# Patient Record
Sex: FEMALE | Race: OTHER
[De-identification: names, ages, dates, MRNs, and addresses within clinical notes are randomized per-mention and may not be internally consistent; named-entity substitution may affect disease eponyms.]

---

## 2018-04-14 ENCOUNTER — HOSPITAL ENCOUNTER (OUTPATIENT)
Dept: HOSPITAL 62 - ER | Age: 23
LOS: 1 days | Discharge: HOME | End: 2018-04-15
Attending: SURGERY
Payer: SELF-PAY

## 2018-04-14 DIAGNOSIS — K80.10: Primary | ICD-10-CM

## 2018-04-14 DIAGNOSIS — F12.10: ICD-10-CM

## 2018-04-14 DIAGNOSIS — F17.200: ICD-10-CM

## 2018-04-14 DIAGNOSIS — K82.1: ICD-10-CM

## 2018-04-14 LAB
ADD MANUAL DIFF: NO
ALBUMIN SERPL-MCNC: 4.3 G/DL (ref 3.5–5)
ALP SERPL-CCNC: 66 U/L (ref 38–126)
ALT SERPL-CCNC: 77 U/L (ref 9–52)
ANION GAP SERPL CALC-SCNC: 12 MMOL/L (ref 5–19)
APPEARANCE UR: (no result)
APTT PPP: YELLOW S
AST SERPL-CCNC: 40 U/L (ref 14–36)
BASOPHILS # BLD AUTO: 0 10^3/UL (ref 0–0.2)
BASOPHILS NFR BLD AUTO: 0.4 % (ref 0–2)
BILIRUB DIRECT SERPL-MCNC: 0.2 MG/DL (ref 0–0.4)
BILIRUB SERPL-MCNC: 0.2 MG/DL (ref 0.2–1.3)
BILIRUB UR QL STRIP: NEGATIVE
BUN SERPL-MCNC: 17 MG/DL (ref 7–20)
CALCIUM: 9.8 MG/DL (ref 8.4–10.2)
CHLORIDE SERPL-SCNC: 106 MMOL/L (ref 98–107)
CO2 SERPL-SCNC: 27 MMOL/L (ref 22–30)
EOSINOPHIL # BLD AUTO: 0.1 10^3/UL (ref 0–0.6)
EOSINOPHIL NFR BLD AUTO: 2.1 % (ref 0–6)
ERYTHROCYTE [DISTWIDTH] IN BLOOD BY AUTOMATED COUNT: 16.5 % (ref 11.5–14)
GLUCOSE SERPL-MCNC: 135 MG/DL (ref 75–110)
GLUCOSE UR STRIP-MCNC: NEGATIVE MG/DL
HCT VFR BLD CALC: 38.7 % (ref 36–47)
HGB BLD-MCNC: 12.3 G/DL (ref 12–15.5)
KETONES UR STRIP-MCNC: NEGATIVE MG/DL
LIPASE SERPL-CCNC: 134.9 U/L (ref 23–300)
LYMPHOCYTES # BLD AUTO: 1.9 10^3/UL (ref 0.5–4.7)
LYMPHOCYTES NFR BLD AUTO: 33.6 % (ref 13–45)
MCH RBC QN AUTO: 23.7 PG (ref 27–33.4)
MCHC RBC AUTO-ENTMCNC: 31.7 G/DL (ref 32–36)
MCV RBC AUTO: 75 FL (ref 80–97)
MONOCYTES # BLD AUTO: 0.5 10^3/UL (ref 0.1–1.4)
MONOCYTES NFR BLD AUTO: 9.1 % (ref 3–13)
NEUTROPHILS # BLD AUTO: 3.1 10^3/UL (ref 1.7–8.2)
NEUTS SEG NFR BLD AUTO: 54.8 % (ref 42–78)
NITRITE UR QL STRIP: NEGATIVE
PH UR STRIP: 5 [PH] (ref 5–9)
PLATELET # BLD: 418 10^3/UL (ref 150–450)
POTASSIUM SERPL-SCNC: 4.3 MMOL/L (ref 3.6–5)
PROT SERPL-MCNC: 7 G/DL (ref 6.3–8.2)
PROT UR STRIP-MCNC: NEGATIVE MG/DL
RBC # BLD AUTO: 5.17 10^6/UL (ref 3.72–5.28)
SODIUM SERPL-SCNC: 144.5 MMOL/L (ref 137–145)
SP GR UR STRIP: 1.03
TOTAL CELLS COUNTED % (AUTO): 100 %
UROBILINOGEN UR-MCNC: 2 MG/DL (ref ?–2)
WBC # BLD AUTO: 5.7 10^3/UL (ref 4–10.5)

## 2018-04-14 PROCEDURE — 96375 TX/PRO/DX INJ NEW DRUG ADDON: CPT

## 2018-04-14 PROCEDURE — 47562 LAPAROSCOPIC CHOLECYSTECTOMY: CPT

## 2018-04-14 PROCEDURE — 88304 TISSUE EXAM BY PATHOLOGIST: CPT

## 2018-04-14 PROCEDURE — 84703 CHORIONIC GONADOTROPIN ASSAY: CPT

## 2018-04-14 PROCEDURE — 96361 HYDRATE IV INFUSION ADD-ON: CPT

## 2018-04-14 PROCEDURE — 85025 COMPLETE CBC W/AUTO DIFF WBC: CPT

## 2018-04-14 PROCEDURE — 81001 URINALYSIS AUTO W/SCOPE: CPT

## 2018-04-14 PROCEDURE — 36415 COLL VENOUS BLD VENIPUNCTURE: CPT

## 2018-04-14 PROCEDURE — 76705 ECHO EXAM OF ABDOMEN: CPT

## 2018-04-14 PROCEDURE — 87040 BLOOD CULTURE FOR BACTERIA: CPT

## 2018-04-14 PROCEDURE — 99285 EMERGENCY DEPT VISIT HI MDM: CPT

## 2018-04-14 PROCEDURE — 96365 THER/PROPH/DIAG IV INF INIT: CPT

## 2018-04-14 PROCEDURE — 83690 ASSAY OF LIPASE: CPT

## 2018-04-14 PROCEDURE — 0FT44ZZ RESECTION OF GALLBLADDER, PERCUTANEOUS ENDOSCOPIC APPROACH: ICD-10-PCS | Performed by: SURGERY

## 2018-04-14 PROCEDURE — S0028 INJECTION, FAMOTIDINE, 20 MG: HCPCS

## 2018-04-14 PROCEDURE — 80053 COMPREHEN METABOLIC PANEL: CPT

## 2018-04-14 PROCEDURE — 96372 THER/PROPH/DIAG INJ SC/IM: CPT

## 2018-04-14 RX ADMIN — FAMOTIDINE SCH MG: 10 INJECTION INTRAVENOUS at 21:24

## 2018-04-14 RX ADMIN — DEXTROSE SCH GM: 50 INJECTION, SOLUTION INTRAVENOUS at 21:24

## 2018-04-14 RX ADMIN — MORPHINE SULFATE PRN MG: 10 INJECTION INTRAMUSCULAR; INTRAVENOUS; SUBCUTANEOUS at 17:10

## 2018-04-14 NOTE — ER DOCUMENT REPORT
ED General





- General


Chief Complaint: Abdominal Pain


Stated Complaint: STOMACH PAIN


Time Seen by Provider: 04/14/18 09:07


TRAVEL OUTSIDE OF THE [place] IN LAST 30 DAYS: No





- HPI


Patient complains to provider of: Abdominal pain


Notes: 





Patient coming in for evaluation of right upper quadrant abdominal pain sharp 

radiating to her back.  Patient also states nausea no vomiting no diarrhea no 

fevers.  Patient states started around 2:00 worse earlier this morning.  

Patient has a known gallstones.  Patient when asked what she had a ER last meal 

was very unclear.  Patient denies any other past medical history does not take 

any other medications.





- Related Data


Allergies/Adverse Reactions: 


 





No Known Allergies Allergy (Unverified 04/14/18 08:46)


 











Past Medical History





- Social History


Smoking Status: Current Some Day Smoker


Frequency of alcohol use: Occasional


Drug Abuse: Marijuana


Family History: Reviewed & Not Pertinent


Patient has suicidal ideation: No


Patient has homicidal ideation: No


Renal/ Medical History: Denies: Hx Peritoneal Dialysis





Review of Systems





- Review of Systems


Constitutional: No symptoms reported


EENT: No symptoms reported


Cardiovascular: No symptoms reported


Respiratory: No symptoms reported


Gastrointestinal: Abdominal pain


Genitourinary: No symptoms reported


Female Genitourinary: No symptoms reported


Musculoskeletal: No symptoms reported


Skin: No symptoms reported


Hematologic/Lymphatic: No symptoms reported


Neurological/Psychological: No symptoms reported


-: Yes All other systems reviewed and negative





Physical Exam





- Vital signs


Vitals: 


 











Temp Pulse Resp BP Pulse Ox


 


 98.2 F   84   19   148/83 H  99 


 


 04/14/18 08:49  04/14/18 08:49  04/14/18 08:49  04/14/18 08:49  04/14/18 08:49











Interpretation: Normal





- General


General appearance: Appears well, Alert





- HEENT


Head: Normocephalic, Atraumatic


Eyes: Normal


Pupils: PERRL





- Respiratory


Respiratory status: No respiratory distress


Chest status: Nontender


Breath sounds: Normal


Chest palpation: Normal





- Cardiovascular


Rhythm: Regular


Heart sounds: Normal auscultation


Murmur: No





- Abdominal


Inspection: Normal


Distension: No distension


Bowel sounds: Normal


Tenderness: Tender - Right upper quadrant tenderness moderate, Rubio's sign


Organomegaly: No organomegaly





- Back


Back: Normal, Nontender





- Extremities


General upper extremity: Normal inspection, Nontender, Normal color, Normal ROM

, Normal temperature


General lower extremity: Normal inspection, Nontender, Normal color, Normal ROM

, Normal temperature, Normal weight bearing.  No: Kamran's sign





- Neurological


Neuro grossly intact: Yes


Cognition: Normal


Orientation: AAOx4


[place] Coma Scale Eye Opening: Spontaneous


[place] Coma Scale Verbal: Oriented


Yohan Coma Scale Motor: Obeys Commands


Yohan Coma Scale Total: 15


Speech: Normal


Motor strength normal: LUE, RUE, LLE, RLE


Sensory: Normal





- Psychological


Associated symptoms: Normal affect, Normal mood





- Skin


Skin Temperature: Warm


Skin Moisture: Dry


Skin Color: Normal





Course





- Re-evaluation


Re-evalutation: 





04/14/18 14:38


Patient was to have symptomatic cholelithiasis.  Did discuss results with 

surgeon on-call Dr. Ott who agrees to take the patient to the OR.  Patient 

agrees with operative management.  Patient was given cefoxitin 2 g per surgeon 

request





- Vital Signs


Vital signs: 


 











Temp Pulse Resp BP Pulse Ox


 


 98.2 F   84   19   148/83 H  99 


 


 04/14/18 08:49  04/14/18 08:49  04/14/18 08:49  04/14/18 08:49  04/14/18 08:49














- Laboratory


Result Diagrams: 


 04/14/18 09:25





 04/14/18 09:25


Laboratory results interpreted by me: 


 











  04/14/18 04/14/18 04/14/18





  09:25 09:25 10:52


 


MCV  75 L  


 


MCH  23.7 L  


 


MCHC  31.7 L  


 


RDW  16.5 H  


 


Glucose   135 H 


 


AST   40 H 


 


ALT   77 H 


 


Urine Urobilinogen    2.0 H


 


Ur Leukocyte Esterase    MODERATE H














Discharge





- Discharge


Clinical Impression: 


 Symptomatic cholelithiasis





Condition: Good


Disposition: ADMITTED AS OBSERVATION


Admitting Provider: Surgicalist Julius Ott


Unit Admitted: OR

## 2018-04-14 NOTE — OPERATIVE REPORT E
Operative Report



NAME: CHIKA VENTURA

MRN:  [id number]          : 1995 AGE:  22Y

DATE OF SURGERY: 2018               ROOM: 228



PREOPERATIVE DIAGNOSES:

1.   Symptomatic cholelithiasis.

2.   Hydrops of the gallbladder.



POSTOPERATIVE DIAGNOSES:

1.   Symptomatic cholelithiasis.

2.   Hydrops of the gallbladder.



OPERATION:

Laparoscopic cholecystectomy.



SURGEON:

MARAL AARON M.D.



ASSISTANT:

None.



ESTIMATED BLOOD LOSS:

Less than 5 mL.



COMPLICATIONS:

None.



ANESTHESIA:

General plus 20 mL of 1% lidocaine with epinephrine.



FLUIDS:

700 mL.



URINE OUTPUT:  Moderate.



DRAINS:

None.



INDICATIONS AND FINDINGS:

This is a young, 22-year-old, -American female who presented to the

hospital with a 12 hour history of right upper quadrant pain, epigastric

pain, intense nausea, and found to have cholelithiasis on ultrasound with

normal liver profile.  Decision was made to take the patient to surgery to

undergo a laparoscopic cholecystectomy, possible open cholangiogram.  Procedure

risks and benefits and complications were explained to the patient

including a possibility of bleeding or injury of the common bile duct 

which might require a repair in a tertiary center.

All her questions were answered.  She decided to proceed.



DESCRIPTION OF PROCEDURE:

Procedure was done in the operating room.  The patient was placed in the

supine position.  General anesthesia was induced by endotracheal

intubation.  Her abdomen was prepped and draped in the usual fashion.  An

incision was made just below the umbilicus.  The skin was tethered with

towel clips, and a 5 mm port with Optiview adaptor and the scope was

inserted through the abdominal wall and a CO2 pneumoperitoneum  was 
established.  Under

direct visualization, three additional ports one 12 mm and two 5 mm ports

were placed in the epigastrium and right lower quadrant of the abdomen, 

respectively. The gallbladder was grasped at the level of the fundus and 
elevated.  The

neck of the gallbladder was then grasped and pulled anteriorly to the

patient's left.  The critical view of safety was obtained by dividing the 

gallbladder neck peritoneal attachements medially and laterally. 

The dissection was then continued toward the cystic duct until a critical view

of safety was obtained.  Visualization of the cystic duct was accomplished

with a Hook cautery at low settings and a 10 mm clip applier  was fired

proximally and distally to the cystic duct division point, and this was then 
divided with

scissors.  The cystic duct was then divided posteriorly after it was

singly clipped proximally and distally.  The gallbladder was dissected off the 
liver bed

using hook cautery at higher settings and extracted from the peritoneal cavity 
using an

Endobag.  The CO2 pneumoperitoneum was then re-established.  The liver bed

was inspected.  No active bleeding was noted.  The liver bed was irrigated

with normal saline until clear, and this was slowly aspirated.  Under

direct visualization, using a fascial closure device, a figure-of-eight O

Vicryl suture was placed to close the epigastric fascial defect.  The

suture was left untied.  All instruments were removed.  The CO2

pneumoperitoneum was released.  The ports were removed.  The fascial

defect of the epigastrium was closed with the previously placed

figure-of-eight 0 Vicryl suture.  All incisions were closed with 4-O Vicryl

running subcuticular suture with Dermabond applied.  The patient tolerated

the procedure well, was extubated, and transferred to the recovery room in

satisfactory condition.





DICTATING PHYSICIAN:  MARAL AARON M.D.





[id number]                  DT: 2018    1702

PHY#: [id number]            DD: 2018    1559

ID:   [id number]           JOB#: [id number]       ACCT: [id number]



cc:MARAL AARON M.D.

>







MTDD

## 2018-04-14 NOTE — PDOC H&P
History of Present Illness


Admission Date/PCP: 





4/14/18


Patient complains of: RUQ pain


History of Present Illness: 


CHIKA VENTURA is a 22 year old female with a 12 hr hx of RUQ pain, intense 

nausea; an US of the GB was done revealing hydrops of the GB with stones.








Social History


Smoking Status: Current Some Day Smoker


Frequency of Alcohol Use: Occasional


Hx Recreational Drug Use: Yes


Drugs: Marijuana





Family History


Family History: Reviewed & Not Pertinent


Parental Family History Reviewed: No


Children Family History Reviewed: No


Sibling(s) Family History Reviewed.: No





Medication/Allergy


Allergies/Adverse Reactions: 


 





No Known Allergies Allergy (Unverified 04/14/18 08:46)


 











Physical Exam


Vital Signs: 


 











Temp Pulse Resp BP Pulse Ox


 


 98.2 F   84   19   148/83 H  99 


 


 04/14/18 08:49  04/14/18 08:49  04/14/18 08:49  04/14/18 08:49  04/14/18 08:49








 Intake & Output











 04/13/18 04/14/18 04/15/18





 06:59 06:59 06:59


 


Weight   117.5 kg











General appearance: PRESENT: no acute distress


Eye exam: PRESENT: EOMI


Mouth exam: PRESENT: moist


Neck exam: PRESENT: full ROM


Cardiovascular exam: PRESENT: RRR


GI/Abdominal exam: PRESENT: soft, tenderness - RUQ





Results


Laboratory Results: 


 





 04/14/18 09:25 





 04/14/18 09:25 





 











  04/14/18 04/14/18 04/14/18





  09:25 09:25 09:25


 


WBC  5.7  


 


RBC  5.17  


 


Hgb  12.3  


 


Hct  38.7  


 


MCV  75 L  


 


MCH  23.7 L  


 


MCHC  31.7 L  


 


RDW  16.5 H  


 


Plt Count  418  


 


Seg Neutrophils %  54.8  


 


Lymphocytes %  33.6  


 


Monocytes %  9.1  


 


Eosinophils %  2.1  


 


Basophils %  0.4  


 


Absolute Neutrophils  3.1  


 


Absolute Lymphocytes  1.9  


 


Absolute Monocytes  0.5  


 


Absolute Eosinophils  0.1  


 


Absolute Basophils  0.0  


 


Sodium   144.5 


 


Potassium   4.3 


 


Chloride   106 


 


Carbon Dioxide   27 


 


Anion Gap   12 


 


BUN   17 


 


Creatinine   0.60 


 


Est GFR ( Amer)   > 60 


 


Est GFR (Non-Af Amer)   > 60 


 


Glucose   135 H 


 


Calcium   9.8 


 


Total Bilirubin   0.2 


 


AST   40 H 


 


ALT   77 H 


 


Alkaline Phosphatase   66 


 


Total Protein   7.0 


 


Albumin   4.3 


 


Lipase   134.9 


 


Serum HCG, Qual    NEGATIVE


 


Urine Color   


 


Urine Appearance   


 


Urine pH   


 


Ur Specific Gravity   


 


Urine Protein   


 


Urine Glucose (UA)   


 


Urine Ketones   


 


Urine Blood   


 


Urine Nitrite   


 


Ur Leukocyte Esterase   


 


Urine WBC (Auto)   


 


Urine RBC (Auto)   














  04/14/18





  10:52


 


WBC 


 


RBC 


 


Hgb 


 


Hct 


 


MCV 


 


MCH 


 


MCHC 


 


RDW 


 


Plt Count 


 


Seg Neutrophils % 


 


Lymphocytes % 


 


Monocytes % 


 


Eosinophils % 


 


Basophils % 


 


Absolute Neutrophils 


 


Absolute Lymphocytes 


 


Absolute Monocytes 


 


Absolute Eosinophils 


 


Absolute Basophils 


 


Sodium 


 


Potassium 


 


Chloride 


 


Carbon Dioxide 


 


Anion Gap 


 


BUN 


 


Creatinine 


 


Est GFR ( Amer) 


 


Est GFR (Non-Af Amer) 


 


Glucose 


 


Calcium 


 


Total Bilirubin 


 


AST 


 


ALT 


 


Alkaline Phosphatase 


 


Total Protein 


 


Albumin 


 


Lipase 


 


Serum HCG, Qual 


 


Urine Color  YELLOW


 


Urine Appearance  SLIGHTLY-CLOUDY


 


Urine pH  5.0


 


Ur Specific Gravity  1.029


 


Urine Protein  NEGATIVE


 


Urine Glucose (UA)  NEGATIVE


 


Urine Ketones  NEGATIVE


 


Urine Blood  NEGATIVE


 


Urine Nitrite  NEGATIVE


 


Ur Leukocyte Esterase  MODERATE H


 


Urine WBC (Auto)  12


 


Urine RBC (Auto)  1











Impressions: 


 





Abdomen Ultrasound  04/14/18 09:12


IMPRESSION:  Cholelithiasis with a hydropic gallbladder and no gallbladder wall 

thickening or pericholecystic fluid.  These findings are equivocal for acute 

cholecystitis.


 














Assessment & Plan





- Plan Summary


Plan Summary: 





A/





Symptomatic cholelithiasis with hydrops of the GB


Slight elevation of LFT


Remaining blood work WNL





P/





NPO


IV Hydration


Mefoxin 2 gr IVPB preop


Laparoscopci cholecystectomy, possible open, possible cholangiogram


Procedure, risks, benefits, complications, including bleeding from the liver 

and or injury of the bile ducts requiring transfer to a tertiary center have 

bee d/w patient, her questions were answered, and she decided to proceed

## 2018-04-14 NOTE — RADIOLOGY REPORT (SQ)
EXAM DESCRIPTION:  U/S ABDOMEN LIMITED W/O DOP



COMPLETED DATE/TIME:  4/14/2018 9:55 am



REASON FOR STUDY:  RUQ PAIN



COMPARISON:  None.



TECHNIQUE:  Dynamic and static grayscale images acquired of the abdomen and recorded on PACS. Additio
nal selected color Doppler and spectral images recorded.



LIMITATIONS:  None.



FINDINGS:  PANCREAS: The tail is not visualized.  Visualized portions of the head and body are grossl
y unremarkable.

LIVER: Increased echogenicity with coarsened echotexture.  Limited evaluation of the parenchyma secon
cee to these findings.

LIVER VASCULATURE: Normal directional flow of the main portal vein.

GALLBLADDER: There is a 2.1 cm stone that is non mobile and situated within the neck of the gallbladd
er.  The gallbladder is hydropic at 13 cm in size.  No wall thickening or pericholecystic fluid.

ULTRASOUND-DETECTED AVALOS'S SIGN: Negative.

INTRAHEPATIC DUCTS AND COMMON DUCT: CBD and intrahepatic ducts normal caliber. No filling defects.

AORTA: No aneurysm.

RIGHT KIDNEY:  Normal size. Normal echogenicity. No solid or suspicious masses. No hydronephrosis. No
 calcifications.

PERITONEAL AND RIGHT PLEURAL SPACE: No ascites or effusions.

OTHER: No other significant findings.



IMPRESSION:  Cholelithiasis with a hydropic gallbladder and no gallbladder wall thickening or pericho
lecystic fluid.  These findings are equivocal for acute cholecystitis.



TECHNICAL DOCUMENTATION:  JOB ID:  [id number]

 2011 [employer]- All Rights Reserved



Reading location - IP/workstation name: [email or web address]

## 2018-04-15 VITALS — DIASTOLIC BLOOD PRESSURE: 72 MMHG | SYSTOLIC BLOOD PRESSURE: 128 MMHG

## 2018-04-15 LAB
ADD MANUAL DIFF: NO
ALBUMIN SERPL-MCNC: 3.6 G/DL (ref 3.5–5)
ALP SERPL-CCNC: 61 U/L (ref 38–126)
ALT SERPL-CCNC: 97 U/L (ref 9–52)
ANION GAP SERPL CALC-SCNC: 11 MMOL/L (ref 5–19)
AST SERPL-CCNC: 69 U/L (ref 14–36)
BASOPHILS # BLD AUTO: 0.1 10^3/UL (ref 0–0.2)
BASOPHILS NFR BLD AUTO: 0.7 % (ref 0–2)
BILIRUB DIRECT SERPL-MCNC: 0.2 MG/DL (ref 0–0.4)
BILIRUB SERPL-MCNC: 0.2 MG/DL (ref 0.2–1.3)
BUN SERPL-MCNC: 11 MG/DL (ref 7–20)
CALCIUM: 9.5 MG/DL (ref 8.4–10.2)
CHLORIDE SERPL-SCNC: 109 MMOL/L (ref 98–107)
CO2 SERPL-SCNC: 23 MMOL/L (ref 22–30)
EOSINOPHIL # BLD AUTO: 0 10^3/UL (ref 0–0.6)
EOSINOPHIL NFR BLD AUTO: 0.3 % (ref 0–6)
ERYTHROCYTE [DISTWIDTH] IN BLOOD BY AUTOMATED COUNT: 16.6 % (ref 11.5–14)
GLUCOSE SERPL-MCNC: 124 MG/DL (ref 75–110)
HCT VFR BLD CALC: 35.1 % (ref 36–47)
HGB BLD-MCNC: 11 G/DL (ref 12–15.5)
LYMPHOCYTES # BLD AUTO: 1.2 10^3/UL (ref 0.5–4.7)
LYMPHOCYTES NFR BLD AUTO: 10.4 % (ref 13–45)
MCH RBC QN AUTO: 23.3 PG (ref 27–33.4)
MCHC RBC AUTO-ENTMCNC: 31.3 G/DL (ref 32–36)
MCV RBC AUTO: 75 FL (ref 80–97)
MONOCYTES # BLD AUTO: 0.5 10^3/UL (ref 0.1–1.4)
MONOCYTES NFR BLD AUTO: 4.7 % (ref 3–13)
NEUTROPHILS # BLD AUTO: 9.4 10^3/UL (ref 1.7–8.2)
NEUTS SEG NFR BLD AUTO: 83.9 % (ref 42–78)
PLATELET # BLD: 411 10^3/UL (ref 150–450)
POTASSIUM SERPL-SCNC: 4.3 MMOL/L (ref 3.6–5)
PROT SERPL-MCNC: 6.5 G/DL (ref 6.3–8.2)
RBC # BLD AUTO: 4.7 10^6/UL (ref 3.72–5.28)
SODIUM SERPL-SCNC: 142.9 MMOL/L (ref 137–145)
TOTAL CELLS COUNTED % (AUTO): 100 %
WBC # BLD AUTO: 11.2 10^3/UL (ref 4–10.5)

## 2018-04-15 RX ADMIN — DEXTROSE SCH GM: 50 INJECTION, SOLUTION INTRAVENOUS at 05:32

## 2018-04-15 RX ADMIN — FAMOTIDINE SCH MG: 10 INJECTION INTRAVENOUS at 10:59

## 2018-04-15 RX ADMIN — SODIUM CHLORIDE PRN ML: 9 INJECTION, SOLUTION INTRAVENOUS at 10:59

## 2018-04-15 RX ADMIN — MORPHINE SULFATE PRN MG: 10 INJECTION INTRAMUSCULAR; INTRAVENOUS; SUBCUTANEOUS at 03:38

## 2018-04-15 RX ADMIN — MORPHINE SULFATE PRN MG: 10 INJECTION INTRAMUSCULAR; INTRAVENOUS; SUBCUTANEOUS at 00:05

## 2018-04-15 RX ADMIN — MORPHINE SULFATE PRN MG: 10 INJECTION INTRAMUSCULAR; INTRAVENOUS; SUBCUTANEOUS at 08:49

## 2018-04-15 RX ADMIN — MORPHINE SULFATE PRN MG: 10 INJECTION INTRAMUSCULAR; INTRAVENOUS; SUBCUTANEOUS at 06:47

## 2018-04-15 RX ADMIN — SODIUM CHLORIDE PRN ML: 9 INJECTION, SOLUTION INTRAVENOUS at 01:06

## 2018-04-15 NOTE — PDOC PROGRESS REPORT
Subjective


Progress Note for:: 04/15/18


Subjective:: 





no c/o


Reason For Visit: 


SYMPTOMATIC CHOLELITHIASIS








Physical Exam


Vital Signs: 


 











Temp Pulse Resp BP Pulse Ox


 


 97.8 F   78   16   119/61   99 


 


 04/15/18 11:05  04/15/18 11:05  04/15/18 11:05  04/15/18 11:05  04/15/18 11:05








 Intake & Output











 04/14/18 04/15/18 04/16/18





 06:59 06:59 06:59


 


Intake Total  1200 


 


Output Total  500 


 


Balance  700 











General appearance: PRESENT: no acute distress


Respiratory exam: PRESENT: clear to auscultation [place]


Cardiovascular exam: PRESENT: RRR


GI/Abdominal exam: PRESENT: soft, other - incisions c/d/i





Results


Laboratory Results: 


 





 04/15/18 07:16 





 04/15/18 07:16 





 











  04/15/18 04/15/18





  07:16 07:16


 


WBC  11.2 H 


 


RBC  4.70 


 


Hgb  11.0 L 


 


Hct  35.1 L 


 


MCV  75 L 


 


MCH  23.3 L 


 


MCHC  31.3 L 


 


RDW  16.6 H 


 


Plt Count  411 


 


Seg Neutrophils %  83.9 H 


 


Lymphocytes %  10.4 L 


 


Monocytes %  4.7 


 


Eosinophils %  0.3 


 


Basophils %  0.7 


 


Absolute Neutrophils  9.4 H 


 


Absolute Lymphocytes  1.2 


 


Absolute Monocytes  0.5 


 


Absolute Eosinophils  0.0 


 


Absolute Basophils  0.1 


 


Sodium   142.9


 


Potassium   4.3


 


Chloride   109 H


 


Carbon Dioxide   23


 


Anion Gap   11


 


BUN   11


 


Creatinine   0.58


 


Est GFR ( Amer)   > 60


 


Est GFR (Non-Af Amer)   > 60


 


Glucose   124 H


 


Calcium   9.5


 


Total Bilirubin   0.2


 


AST   69 H


 


ALT   97 H


 


Alkaline Phosphatase   61


 


Total Protein   6.5


 


Albumin   3.6











Impressions: 


 





Abdomen Ultrasound  04/14/18 09:12


IMPRESSION:  Cholelithiasis with a hydropic gallbladder and no gallbladder wall 

thickening or pericholecystic fluid.  These findings are equivocal for acute 

cholecystitis.


 














Assessment & Plan





- Plan Summary


Plan Summary: 





A/





POD#1 after lap tano for symptomatic cholelithiasis


VSS, AF


Tolerating po well


WBC slight elevated, mnost likely reactive


Slight elevateion ALT/ASt with normal total bilirubin


PE unremarkable








P/





Home today


F/u in the office with CAROLYNN Engel in 1 week


Tylenol only for pain


Return to work on 4/27/18


No wound care needed


shower only, bathe in 2 weeks


Return to all activities without limitations

## 2018-04-15 NOTE — DISCHARGE SUMMARY E
Discharge Summary



NAME: CHIKA VENTURA

MRN:  [id number]        : 1995     AGE: 22Y

ADMITTED: 2018                  DISCHARGED: 04/15/2018



FINAL DIAGNOSIS:

Symptomatic cholelithiasis.



PROCEDURE PERFORMED:

Laparoscopic cholecystectomy performed on 2018.



COMPLICATIONS:

None.



HOSPITAL COURSE:

This is a 22-year-old female, healthy, who came to the hospital with a

12-hour history of right upper quadrant pain and intense nausea.  She was

found to have symptomatic cholelithiasis.  The patient underwent an

uneventful laparoscopic cholecystectomy on 2018.  She was

hospitalized overnight.  Her fluids were then hep-locked.  Her oral intake

was well tolerated.  She complained of minimal abdominal pain and blood

work was within normal limits.  Her vital signs remained stable.  Physical

examination was unremarkable.  The patient was discharged home on April

15, 2018.



DISCHARGE ORDERS:

The patient was discharged on April 15, 2018.  Follow-up appointment in

the surgery clinic with the physician assistant, Layla Vásquez, in one

week.  Tylenol only for pain.  Shower only, bathe in two weeks.  Return to

work next week on 2018, without limitations.  Regular

diet.



DICTATING PHYSICIAN:  MARAL AARON M.D.





[id number]                  DT: 04/15/2018    1622

PHY#: [id number]            DD: 04/15/2018    1243

ID:   [id number]           JOB#: [id number]       ACCT: [id number]



cc:INGRID CREWS MD, M.D.

   [facility],

>

## 2020-05-27 ENCOUNTER — HOSPITAL ENCOUNTER (EMERGENCY)
Dept: HOSPITAL 62 - ER | Age: 25
Discharge: HOME | End: 2020-05-27
Payer: SELF-PAY

## 2020-05-27 VITALS — SYSTOLIC BLOOD PRESSURE: 115 MMHG | DIASTOLIC BLOOD PRESSURE: 67 MMHG

## 2020-05-27 DIAGNOSIS — R82.4: ICD-10-CM

## 2020-05-27 DIAGNOSIS — O26.891: ICD-10-CM

## 2020-05-27 DIAGNOSIS — O20.9: Primary | ICD-10-CM

## 2020-05-27 DIAGNOSIS — O36.0910: ICD-10-CM

## 2020-05-27 DIAGNOSIS — Z3A.01: ICD-10-CM

## 2020-05-27 LAB
ADD MANUAL DIFF: NO
ALBUMIN SERPL-MCNC: 4.4 G/DL (ref 3.5–5)
ALP SERPL-CCNC: 70 U/L (ref 38–126)
ANION GAP SERPL CALC-SCNC: 10 MMOL/L (ref 5–19)
APPEARANCE UR: (no result)
APTT PPP: YELLOW S
AST SERPL-CCNC: 22 U/L (ref 14–36)
BASOPHILS # BLD AUTO: 0 10^3/UL (ref 0–0.2)
BASOPHILS NFR BLD AUTO: 0.9 % (ref 0–2)
BILIRUB DIRECT SERPL-MCNC: 0 MG/DL (ref 0–0.4)
BILIRUB SERPL-MCNC: 0.5 MG/DL (ref 0.2–1.3)
BILIRUB UR QL STRIP: NEGATIVE
BUN SERPL-MCNC: 11 MG/DL (ref 7–20)
CALCIUM: 9.7 MG/DL (ref 8.4–10.2)
CHLORIDE SERPL-SCNC: 105 MMOL/L (ref 98–107)
CO2 SERPL-SCNC: 24 MMOL/L (ref 22–30)
EOSINOPHIL # BLD AUTO: 0 10^3/UL (ref 0–0.6)
EOSINOPHIL NFR BLD AUTO: 0.4 % (ref 0–6)
ERYTHROCYTE [DISTWIDTH] IN BLOOD BY AUTOMATED COUNT: 16 % (ref 11.5–14)
GLUCOSE SERPL-MCNC: 97 MG/DL (ref 75–110)
GLUCOSE UR STRIP-MCNC: NEGATIVE MG/DL
HCT VFR BLD CALC: 35.6 % (ref 36–47)
HGB BLD-MCNC: 11.4 G/DL (ref 12–15.5)
KETONES UR STRIP-MCNC: 80 MG/DL
LYMPHOCYTES # BLD AUTO: 1.3 10^3/UL (ref 0.5–4.7)
LYMPHOCYTES NFR BLD AUTO: 34.6 % (ref 13–45)
MCH RBC QN AUTO: 24.1 PG (ref 27–33.4)
MCHC RBC AUTO-ENTMCNC: 32.1 G/DL (ref 32–36)
MCV RBC AUTO: 75 FL (ref 80–97)
MONOCYTES # BLD AUTO: 0.3 10^3/UL (ref 0.1–1.4)
MONOCYTES NFR BLD AUTO: 9.5 % (ref 3–13)
NEUTROPHILS # BLD AUTO: 2 10^3/UL (ref 1.7–8.2)
NEUTS SEG NFR BLD AUTO: 54.6 % (ref 42–78)
NITRITE UR QL STRIP: NEGATIVE
PH UR STRIP: 5 [PH] (ref 5–9)
PLATELET # BLD: 419 10^3/UL (ref 150–450)
POTASSIUM SERPL-SCNC: 4.5 MMOL/L (ref 3.6–5)
PROT SERPL-MCNC: 7.7 G/DL (ref 6.3–8.2)
PROT UR STRIP-MCNC: 30 MG/DL
RBC # BLD AUTO: 4.74 10^6/UL (ref 3.72–5.28)
SP GR UR STRIP: 1.03
TOTAL CELLS COUNTED % (AUTO): 100 %
UROBILINOGEN UR-MCNC: NEGATIVE MG/DL (ref ?–2)
WBC # BLD AUTO: 3.7 10^3/UL (ref 4–10.5)

## 2020-05-27 PROCEDURE — 99284 EMERGENCY DEPT VISIT MOD MDM: CPT

## 2020-05-27 PROCEDURE — 83690 ASSAY OF LIPASE: CPT

## 2020-05-27 PROCEDURE — 86901 BLOOD TYPING SEROLOGIC RH(D): CPT

## 2020-05-27 PROCEDURE — 81001 URINALYSIS AUTO W/SCOPE: CPT

## 2020-05-27 PROCEDURE — 84702 CHORIONIC GONADOTROPIN TEST: CPT

## 2020-05-27 PROCEDURE — 76817 TRANSVAGINAL US OBSTETRIC: CPT

## 2020-05-27 PROCEDURE — 93976 VASCULAR STUDY: CPT

## 2020-05-27 PROCEDURE — 36415 COLL VENOUS BLD VENIPUNCTURE: CPT

## 2020-05-27 PROCEDURE — 86900 BLOOD TYPING SEROLOGIC ABO: CPT

## 2020-05-27 PROCEDURE — 80053 COMPREHEN METABOLIC PANEL: CPT

## 2020-05-27 PROCEDURE — 85025 COMPLETE CBC W/AUTO DIFF WBC: CPT

## 2020-05-27 PROCEDURE — 86850 RBC ANTIBODY SCREEN: CPT

## 2020-05-27 NOTE — ER DOCUMENT REPORT
ED General





- General


Chief Complaint: Vag Bleeding, +preg <12wks


Stated Complaint: VAGINAL BLEEDING


Time Seen by Provider: 05/27/20 09:16


Primary Care Provider: 


[facility] [Provider Group] - Follow up in 3-5 days


Notes: 





Patient is a G2, P1 24-year-old female who presents emergency department who 

presents the emergency department with vaginal bleeding.  Patient states that 

she is about 4 weeks pregnant.  Last menstrual cycle was May 24.  She states 

that this morning when she woke up she felt a little bit of cramping and when 

she went to go wipe, saw a little bit of blood on her toilet paper.  Patient 

denies any clots.  Denies any past medical history.  She does not take any 

medications.  Started prenatal vitamins the other day.  Has not seen OB for care

yet.


TRAVEL OUTSIDE OF THE [place] IN LAST 30 DAYS: No





- Related Data


Allergies/Adverse Reactions: 


                                        





No Known Allergies Allergy (Unverified 04/14/18 08:46)


   











Past Medical History





- Social History


Smoking Status: Never Smoker


Family History: Reviewed & Not Pertinent


Patient has homicidal ideation: No


Renal/ Medical History: Denies: Hx Peritoneal Dialysis





Review of Systems





- Review of Systems


Notes: 





REVIEW OF SYSTEMS:





CONSTITUTIONAL :    Denies recent illness.  Denies recent unintentional weight 

loss.  Denies fever,  chills, or sweats. 


EENT: Denies eye, ear, throat, or mouth pain, discharge, or symptoms.  Denies 

nasal or sinus congestion.


CARDIOVASCULAR:  Denies chest pain.


RESPIRATORY: Denies shortness of breath, cough, congestion, difficulty 

breathing, or wheezing. 


GASTROINTESTINAL: Denies nausea, vomiting, and diarrhea.  Denies abdominal pain.

 Denies constipation.   


GENITOURINARY:  Denies difficulty urinating, burning, blood in urine, urgency or

frequency.


FEMALE  GENITOURINARY: See HPI.


MUSCULOSKELETAL:  Denies neck and back pain.  Denies joint pain or swelling.


SKIN:   Denies rash, itchiness, or lesions


HEMATOLOGIC :   Denies easy bruising or bleeding.


LYMPHATIC:  Denies swollen, painful, enlarged glands.


NEUROLOGICAL: Denies no numbness or tingling denies weakness.  Denies headache. 

Denies altered mental status.  Denies alteration in speech.


PSYCHIATRIC:  Denies stress, anxiety, alteration in sleep patterns, or 

depression.





All other systems reviewed and negative.





Physical Exam





- Vital signs


Vitals: 


                                        











Temp Pulse Resp BP Pulse Ox


 


 98.6 F   78   18   134/68 H  100 


 


 05/27/20 08:44  05/27/20 08:44  05/27/20 08:44  05/27/20 08:44  05/27/20 08:44














- Notes


Notes: 





PHYSICAL EXAMINATION:





GENERAL: Appears well, healthy, well-nourished, no acute distress. 





HEAD:  Normocephalic, atraumatic.





EYES:  PERRL, conjunctiva normal, all extraocular movements intact, sclera 

nonicteric





ENT:  Moist mucous membranes. 





NECK: Supple, no noticeable swelling, redness, rash.  Normal range of motion.





LUNGS: Equal breath sounds bilaterally and clear to auscultation.  No wheezes 

rales or rhonchi.





CARDIOVASCULAR: S1-S2, regular rate, regular rhythm.  Radial pulses 2+, normal.





ABDOMEN: Normoactive bowel sounds.  Soft, nontender,  no guarding, no rebound 

tenderness, and no masses palpated.





EXTREMITIES: Normal strength and range of motion, no pitting or edema.  No 

cyanosis. 





NEUROLOGICAL: Moves all extremities upon command.  Strength 5/5 in all 

extremities. 





PSYCH: Normal mood, normal affect.





SKIN: Warm, dry.  No rash, lesions, ulcerations noted.  Normal skin turgor.





Course





- Re-evaluation


Re-evalutation: 





05/27/20 13:03


Patient has a gestational sac, in her uterus off of ultrasound.  Lab results 

confirmed that she is pregnant with a quantitative beta-hCG of 3005.  

Chemistries are unremarkable.  Laboratory studies are also unremarkable.  

Urinalysis shows she has ketones in her urine.  Advised the patient to drink 

more fluids.  She is in agreement with this plan.  She will follow-up with OB.  

Patient will also receive her RhoGam shot here in the emergency department.  

Follow-up precautions were given.  Verbal discharge instructions were given to 

the patient.  They verbalized understanding.  They are stable for discharge.











- Vital Signs


Vital signs: 


                                        











Temp Pulse Resp BP Pulse Ox


 


 98.0 F   87   20   115/67   98 


 


 05/27/20 13:57  05/27/20 13:57  05/27/20 13:57  05/27/20 13:57  05/27/20 13:57














- Laboratory


Result Diagrams: 


                                 05/27/20 10:20





                                 05/27/20 10:20


Laboratory results interpreted by me: 


                                        











  05/27/20 05/27/20 05/27/20





  10:20 10:20 11:20


 


WBC  3.7 L  


 


Hgb  11.4 L  


 


Hct  35.6 L  


 


MCV  75 L  


 


MCH  24.1 L  


 


RDW  16.0 H  


 


Beta HCG, Quant   3005.00 H 


 


Urine Protein    30 H


 


Urine Ketones    80 H


 


Ur Leukocyte Esterase    TRACE H


 


Urine Ascorbic Acid    40 H














Discharge





- Discharge


Clinical Impression: 


 Vaginal bleeding during pregnancy





Condition: Stable


Disposition: HOME, SELF-CARE


Additional Instructions: 


You were seen today in the emergency department vaginal bleeding.  You received 

your RhoGam shot here in the emergency department.  Please follow-up with OB in 

regards to this visit for follow-up labs.  Please do not have sex or place anyt

ayo in your vagina until you are cleared by OB.  If you start passing clots, 

return to the emergency department or follow-up with your OB.


Referrals: 


[facility] [Provider Group] - Follow up in 3-5 days

## 2020-05-27 NOTE — RADIOLOGY REPORT (SQ)
EXAM DESCRIPTION:  U/S OB TRANSVAG W/DOPPLER



IMAGES COMPLETED DATE/TIME:  5/27/2020 12:38 pm



REASON FOR STUDY:  vaginal bleeding; pregnant



COMPARISON:  None.



TECHNIQUE:  Transvaginal static and realtime grayscale images acquired of the pelvis. Additional margy
cted spectral and color Doppler images recorded. All images stored on PACs.

CLINICAL AGE: LMP 4/26[birth year]2020.  EGA based on LMP 4 weeks 3 days.

BHCG: Not available.



LIMITATIONS:  None.



FINDINGS:  UTERUS: The uterus measures 9.3 x 6.4 x 5.4 cm.  There is an intrauterine gestational sac 
that contains no yolk sac or embryo ; based on the MSD of 7 mm the EGA is 5 weeks 2 days.

RIGHT ADNEXA: The right ovary measures 1.6 x 2 x 1.1 cm and on Doppler there is intact arterial inflo
w and venous outflow within the ovarian stroma.  There is no adnexal mass.

LEFT ADNEXA: The left ovary measures 2.2 x 2 x 1 cm and on Doppler there is intact arterial inflow an
d venous outflow within the ovarian stroma.  There is no adnexal mass.

FREE FLUID: None.

OTHER:  The cervix measures 3.2 cm in length.



IMPRESSION:  Intrauterine gestational sac without a yolk sac or embryo.  The findings likely represen
t an early intrauterine gestation and correlation with serial beta HCGs is recommended.



TECHNICAL DOCUMENTATION:  JOB ID:  [id number]

 2011 [employer]- All Rights Reserved



Reading location - IP/workstation name: [email or web address]

## 2020-05-31 ENCOUNTER — HOSPITAL ENCOUNTER (EMERGENCY)
Dept: HOSPITAL 62 - ER | Age: 25
Discharge: HOME | End: 2020-05-31
Payer: SELF-PAY

## 2020-05-31 VITALS — SYSTOLIC BLOOD PRESSURE: 119 MMHG | DIASTOLIC BLOOD PRESSURE: 55 MMHG

## 2020-05-31 DIAGNOSIS — O46.90: Primary | ICD-10-CM

## 2020-05-31 DIAGNOSIS — O99.019: ICD-10-CM

## 2020-05-31 DIAGNOSIS — D64.9: ICD-10-CM

## 2020-05-31 DIAGNOSIS — Z3A.00: ICD-10-CM

## 2020-05-31 DIAGNOSIS — Z79.899: ICD-10-CM

## 2020-05-31 LAB
ADD MANUAL DIFF: NO
BASOPHILS # BLD AUTO: 0 10^3/UL (ref 0–0.2)
BASOPHILS NFR BLD AUTO: 0.4 % (ref 0–2)
EOSINOPHIL # BLD AUTO: 0 10^3/UL (ref 0–0.6)
EOSINOPHIL NFR BLD AUTO: 0.4 % (ref 0–6)
ERYTHROCYTE [DISTWIDTH] IN BLOOD BY AUTOMATED COUNT: 16.4 % (ref 11.5–14)
HCT VFR BLD CALC: 33.4 % (ref 36–47)
HGB BLD-MCNC: 10.9 G/DL (ref 12–15.5)
LYMPHOCYTES # BLD AUTO: 1.5 10^3/UL (ref 0.5–4.7)
LYMPHOCYTES NFR BLD AUTO: 35.2 % (ref 13–45)
MCH RBC QN AUTO: 24.2 PG (ref 27–33.4)
MCHC RBC AUTO-ENTMCNC: 32.6 G/DL (ref 32–36)
MCV RBC AUTO: 74 FL (ref 80–97)
MONOCYTES # BLD AUTO: 0.4 10^3/UL (ref 0.1–1.4)
MONOCYTES NFR BLD AUTO: 10 % (ref 3–13)
NEUTROPHILS # BLD AUTO: 2.3 10^3/UL (ref 1.7–8.2)
NEUTS SEG NFR BLD AUTO: 54 % (ref 42–78)
PLATELET # BLD: 388 10^3/UL (ref 150–450)
RBC # BLD AUTO: 4.49 10^6/UL (ref 3.72–5.28)
TOTAL CELLS COUNTED % (AUTO): 100 %
WBC # BLD AUTO: 4.2 10^3/UL (ref 4–10.5)

## 2020-05-31 PROCEDURE — 85025 COMPLETE CBC W/AUTO DIFF WBC: CPT

## 2020-05-31 PROCEDURE — 99284 EMERGENCY DEPT VISIT MOD MDM: CPT

## 2020-05-31 PROCEDURE — 84702 CHORIONIC GONADOTROPIN TEST: CPT

## 2020-05-31 PROCEDURE — 36415 COLL VENOUS BLD VENIPUNCTURE: CPT

## 2020-05-31 NOTE — ER DOCUMENT REPORT
ED General





- General


Chief Complaint: Vag Bleeding, +preg <12wks


Stated Complaint: VAGINAL BLEEDING/6 WEEKS PREGNANT


Time Seen by Provider: 20 09:52


Primary Care Provider: 


[facility] [Provider Group] - Follow up as needed


Notes: 





24-year-old female  approximately 4 weeks pregnant presenting with continued

vaginal spotting since May 27th.  States she had an episode of spotting 

yesterday and woke up this morning and was spotting again.  Denies any heavy 

clots.  Reports mild abdominal cramping.  No fevers, chills, low back pain, 

light headness, dizziness or other symptoms reported.  She was seen on the  

for the same symptoms.  Beta hCG was 3005, and ultrasound showed intrauterine 

gestational sac without a yolk sac or embryo.  Also received RhoGam on the .

 She has an appointment scheduled with OB tomorrow.


TRAVEL OUTSIDE OF THE [place] IN LAST 30 DAYS: No





- Related Data


Allergies/Adverse Reactions: 


                                        





No Known Allergies Allergy (Unverified 20 10:21)


   








Home Medications: Prenatal vitamin





Past Medical History





- Social History


Smoking Status: Never Smoker


Chew tobacco use (# tins/day): No


Frequency of alcohol use: Rare


Drug Abuse: None


Family History: Reviewed & Not Pertinent


Patient has homicidal ideation: No





- Past Medical History


Cardiac Medical History: Reports: None


Pulmonary Medical History: Reports: None


EENT Medical History: Reports: None


Neurological Medical History: Reports: None


Renal/ Medical History: Reports: None.  Denies: Hx Peritoneal Dialysis





Review of Systems





- Review of Systems


Constitutional: No symptoms reported


EENT: No symptoms reported


Cardiovascular: No symptoms reported


Respiratory: No symptoms reported


Gastrointestinal: No symptoms reported


Genitourinary: No symptoms reported


Female Genitourinary: See HPI


Musculoskeletal: No symptoms reported


Skin: No symptoms reported





Physical Exam





- Vital signs


Vitals: 


                                        











Temp Pulse Resp BP Pulse Ox


 


 98.6 F   86   24 H  122/63   98 


 


 20 09:41  20 09:41  20 09:41  20 09:41  20 09:41














- Notes


Notes: 





Adult General:





GENERAL: Alert, interacts well. No acute distress


HEAD: Normocephalic, atraumatic


EYES: Extraocular movements intact.


ENT:  Airway patent. 


NECK: Full range of motion.  Supple.  Trachea midline. 


LUNGS: Clear to auscultation bilaterally, no wheezes, rales, or rhonchi.  No 

respiratory distress.  Nontender chest wall.


HEART: Regular rate and rhythm.  No murmurs, rubs or gallops.


ABDOMEN: Soft, nontender.  Nondistended.  Bowel sounds present in all 4 

quadrants.


GENITOURINARY: Deferred


EXTREMITIES: Moves all 4 extremities spontaneously.  No edema.


BACK:  Moves all extremities with full range of motion.


NEUROLOGICAL: Alert and oriented x3.  Normal speech.  Strength 5/ 5 in all 

extremities.


PSYCH: Normal affect, normal mood.


SKIN: Warm, dry, normal turgor.  No rashes or lesions noted.





- General


General appearance: Appears well


In distress: None





Course





- Re-evaluation


Re-evalutation: 





20 10:17


Beta-hCG and CBC ordered.  Briseyda with patient I do not feel that additional 

ultrasound at this time would be beneficial she has an appointment with OB 

scheduled tomorrow.


20 11:17


Patient's hemoglobin came back at 10.9.  Last hemoglobin on the  was 11.4.  

Discussed with this with the patient.  She states that she has a history of 

anemia.  Has a history of blood transfusion approximately 4 years ago.  Is that 

she is taking over-the-counter iron supplementation she is also taking a 

prenatal vitamin.  She continues to deny any dizziness, lightheadedness, chest 

pain or shortness of breath.  Still pending results of beta-hCG.


20 12:06


Patient [facility] [place] [phone]. 


20 12:42


Discussed with patient results of her beta-hCG.  Recommend at this time patient 

follow-up with her OB tomorrow.  She needs to continue to take prenatal vitamins

and iron supplementation.  Do not feel that a repeat vaginal ultrasound would be

beneficial at this time as she had one performed 3 days ago and she is also 

seeing OB tomorrow.  Discussed return precautions to include fevers, chills, 

shortness of breath, chest pain or increased vaginal bleeding.  Patient 

acknowledges and verbalized understanding of instructions and plan.


20 12:44








- Vital Signs


Vital signs: 


                                        











Temp Pulse Resp BP Pulse Ox


 


 98.4 F   71   20   119/55 L  100 


 


 20 12:42  20 12:42  20 12:42  20 12:42  20 12:42














- Laboratory


Result Diagrams: 


                                 20 10:35





Laboratory results interpreted by me: 


                                        











  20





  10:35 10:35


 


Hgb   10.9 L


 


Hct   33.4 L


 


MCV   74 L


 


MCH   24.2 L


 


RDW   16.4 H


 


Beta HCG, Quant  75866.00 H 














- EKG Interpretation by Me


Additional EKG results interpreted by me: 





20 12:40


sinus rhythm, rate 65, , qtc 408, no st segment elevations or depressions





Discharge





- Discharge


Clinical Impression: 


 Vaginal bleeding before 22 weeks gestation, Vaginal bleeding during pregnancy





Condition: Stable


Disposition: HOME, SELF-CARE


Instructions:  Bleeding During Early Pregnancy ([facility])


Additional Instructions: 


Keep your appointment with [facility] tomorrow.  Order for you 

to follow-up with your OB in regards to your anemia and first trimester vaginal 

bleeding.  Return precautions to the emergency department include development of

new symptoms or worsening symptoms.


Referrals: 


[facility] [Provider Group] - Follow up as needed

## 2020-06-03 ENCOUNTER — HOSPITAL ENCOUNTER (EMERGENCY)
Dept: HOSPITAL 62 - ER | Age: 25
Discharge: HOME | End: 2020-06-03
Payer: MEDICAID

## 2020-06-03 VITALS — SYSTOLIC BLOOD PRESSURE: 127 MMHG | DIASTOLIC BLOOD PRESSURE: 79 MMHG

## 2020-06-03 DIAGNOSIS — O30.001: ICD-10-CM

## 2020-06-03 DIAGNOSIS — Z3A.01: ICD-10-CM

## 2020-06-03 DIAGNOSIS — Z79.899: ICD-10-CM

## 2020-06-03 DIAGNOSIS — O20.9: Primary | ICD-10-CM

## 2020-06-03 DIAGNOSIS — O99.841: ICD-10-CM

## 2020-06-03 LAB
ADD MANUAL DIFF: NO
ALBUMIN SERPL-MCNC: 4 G/DL (ref 3.5–5)
ALP SERPL-CCNC: 65 U/L (ref 38–126)
ANION GAP SERPL CALC-SCNC: 8 MMOL/L (ref 5–19)
APPEARANCE UR: CLEAR
APTT PPP: (no result) S
AST SERPL-CCNC: 17 U/L (ref 14–36)
BASOPHILS # BLD AUTO: 0 10^3/UL (ref 0–0.2)
BASOPHILS NFR BLD AUTO: 0.9 % (ref 0–2)
BILIRUB DIRECT SERPL-MCNC: 0 MG/DL (ref 0–0.4)
BILIRUB SERPL-MCNC: 0.5 MG/DL (ref 0.2–1.3)
BILIRUB UR QL STRIP: NEGATIVE
BUN SERPL-MCNC: 7 MG/DL (ref 7–20)
CALCIUM: 9.5 MG/DL (ref 8.4–10.2)
CHLORIDE SERPL-SCNC: 105 MMOL/L (ref 98–107)
CO2 SERPL-SCNC: 25 MMOL/L (ref 22–30)
EOSINOPHIL # BLD AUTO: 0 10^3/UL (ref 0–0.6)
EOSINOPHIL NFR BLD AUTO: 0.7 % (ref 0–6)
ERYTHROCYTE [DISTWIDTH] IN BLOOD BY AUTOMATED COUNT: 16.6 % (ref 11.5–14)
GLUCOSE SERPL-MCNC: 95 MG/DL (ref 75–110)
GLUCOSE UR STRIP-MCNC: NEGATIVE MG/DL
HCT VFR BLD CALC: 32.7 % (ref 36–47)
HGB BLD-MCNC: 10.8 G/DL (ref 12–15.5)
KETONES UR STRIP-MCNC: NEGATIVE MG/DL
LYMPHOCYTES # BLD AUTO: 1.2 10^3/UL (ref 0.5–4.7)
LYMPHOCYTES NFR BLD AUTO: 27.8 % (ref 13–45)
MCH RBC QN AUTO: 24.4 PG (ref 27–33.4)
MCHC RBC AUTO-ENTMCNC: 33 G/DL (ref 32–36)
MCV RBC AUTO: 74 FL (ref 80–97)
MONOCYTES # BLD AUTO: 0.4 10^3/UL (ref 0.1–1.4)
MONOCYTES NFR BLD AUTO: 9.5 % (ref 3–13)
NEUTROPHILS # BLD AUTO: 2.7 10^3/UL (ref 1.7–8.2)
NEUTS SEG NFR BLD AUTO: 61.1 % (ref 42–78)
NITRITE UR QL STRIP: NEGATIVE
PH UR STRIP: 6 [PH] (ref 5–9)
PLATELET # BLD: 354 10^3/UL (ref 150–450)
POTASSIUM SERPL-SCNC: 3.9 MMOL/L (ref 3.6–5)
PROT SERPL-MCNC: 7.1 G/DL (ref 6.3–8.2)
PROT UR STRIP-MCNC: NEGATIVE MG/DL
RBC # BLD AUTO: 4.42 10^6/UL (ref 3.72–5.28)
SP GR UR STRIP: 1
TOTAL CELLS COUNTED % (AUTO): 100 %
UROBILINOGEN UR-MCNC: NEGATIVE MG/DL (ref ?–2)
WBC # BLD AUTO: 4.4 10^3/UL (ref 4–10.5)

## 2020-06-03 PROCEDURE — 99284 EMERGENCY DEPT VISIT MOD MDM: CPT

## 2020-06-03 PROCEDURE — 83690 ASSAY OF LIPASE: CPT

## 2020-06-03 PROCEDURE — 84702 CHORIONIC GONADOTROPIN TEST: CPT

## 2020-06-03 PROCEDURE — 85025 COMPLETE CBC W/AUTO DIFF WBC: CPT

## 2020-06-03 PROCEDURE — 80053 COMPREHEN METABOLIC PANEL: CPT

## 2020-06-03 PROCEDURE — 81001 URINALYSIS AUTO W/SCOPE: CPT

## 2020-06-03 PROCEDURE — 76817 TRANSVAGINAL US OBSTETRIC: CPT

## 2020-06-03 PROCEDURE — 36415 COLL VENOUS BLD VENIPUNCTURE: CPT

## 2020-06-03 NOTE — ER DOCUMENT REPORT
Entered by ADRIAN GARVIN SCRIBE  06/03/20 0754 





Acting as scribe for:MOISE SOMERS MD





ED GI/





- General


Chief Complaint: OB Problem (<20wks)


Stated Complaint: VAGINAL BLEEDING 6 WKS PREG


Time Seen by Provider: 06/03/20 07:09


Primary Care Provider: 


DOROTA AVELAR MD [Primary Care Provider] - Follow up as needed


Mode of Arrival: Ambulatory


Information source: Patient


Notes: 





This pregnant 24-year-old female patient presents emergency department today 

with complaints of vaginal bleeding.  Patient was seen here on 5/27 and had a 

quantitative beta-hCG of 3005, on 5/31 the patient's quantitative beta-hCG was 

74552, today the patient's quantitative beta-hCG is 28,652.





Patient reports that she was seen [facility] after her visit here on 5/27

and she had an ultrasound which showed twins. 





Patient states she has not had any cramping with her vaginal bleeding today. 





TRAVEL OUTSIDE OF THE [place] IN LAST 30 DAYS: No





- Related Data


Allergies/Adverse Reactions: 


                                        





No Known Allergies Allergy (Unverified 05/31/20 10:21)


   








Home Medications: prenatal





Past Medical History





- General


Information source: Patient





- Social History


Smoking Status: Never Smoker


Cigarette use (# per day): No


Frequency of alcohol use: None


Drug Abuse: None


Family History: Reviewed & Not Pertinent


Patient has homicidal ideation: No





- Medical History


Medical History: Negative


Past Surgical History: Reports: Hx Abdominal Surgery - gastric sleeve in 2019, 

Hx Cholecystectomy





Review of Systems





- Review of Systems


Constitutional: No symptoms reported


EENT: No symptoms reported


Cardiovascular: No symptoms reported


Respiratory: No symptoms reported


Gastrointestinal: denies: Abdominal pain


Genitourinary: No symptoms reported


Female Genitourinary: See HPI, Pregnant, Vaginal bleeding


Musculoskeletal: No symptoms reported


Skin: No symptoms reported


Hematologic/Lymphatic: No symptoms reported


Neurological/Psychological: No symptoms reported


-: Yes All other systems reviewed and negative





Physical Exam





- Vital signs


Vitals: 


                                        











Temp Pulse Resp BP Pulse Ox


 


 98.4 F   84   18   121/86 H  100 


 


 06/03/20 05:49  06/03/20 05:49  06/03/20 05:49  06/03/20 05:49  06/03/20 05:49














- Notes


Notes: 





Physical Exam:


 


General: Alert, appears well. 


 


HEENT: Normocephalic. Atraumatic. PERRL. Extraocular movements intact. Orop

harynx clear.


 


Neck: Supple. Non-tender.


 


Respiratory: No respiratory distress. Clear and equal breath sounds bilaterally.


 


Cardiovascular: Regular rate and rhythm. 


 


Abdominal: Normal Inspection. Non-tender. No distension. Normal Bowel Sounds. 


 


Back: No gross abnormalities. 


 


Extremities: Moves all four extremities.


Upper extremities: Normal inspection. Normal ROM.  


Lower extremities: Normal inspection. No edema. Normal ROM.


 


Neurological: Normal cognition. AAOx4. Normal speech.  


 


Psychological: Normal affect. Normal Mood. 


 


Skin: Warm. Dry. Normal color.





Course





- Vital Signs


Vital signs: 


                                        











Temp Pulse Resp BP Pulse Ox


 


 98.4 F   84   18   121/86 H  100 


 


 06/03/20 05:49  06/03/20 05:49  06/03/20 05:49  06/03/20 05:49  06/03/20 05:49














- Laboratory


Result Diagrams: 


                                 06/03/20 06:05





                                 06/03/20 06:05


Laboratory results interpreted by me: 


                                        











  06/03/20 06/03/20 06/03/20





  06:05 06:05 06:30


 


Hgb  10.8 L  


 


Hct  32.7 L  


 


MCV  74 L  


 


MCH  24.4 L  


 


RDW  16.6 H  


 


Beta HCG, Quant   44309.00 H 


 


Urine Blood    LARGE H














Discharge





- Discharge


Clinical Impression: 


 Vaginal bleeding affecting early pregnancy





Twin pregnancy


Qualifiers:


 Multiple gestation type: unspecified Trimester: first trimester Qualified 

Code(s): O30.001 - Twin pregnancy, unspecified number of placenta and 

unspecified number of amniotic sacs, first trimester





Condition: Stable


Disposition: HOME, SELF-CARE


Additional Instructions: 


Bleeding During Early Pregnancy





     You have been evaluated for passing blood while pregnant. While we take 

this symptom very seriously, most women with your degree of bleeding will go on 

to have a perfectly normal baby. At this time, there is no indication that a 

miscarriage will occur. (A miscarriage occurs when the fetus is abnormal.  There

is no medicine or treatment to prevent it.)


     A more serious cause of bleeding is tubal pregnancy. An ultrasound can show

whether the pregnancy is in the uterus or in the tube. Sometimes in early 

pregnancy, no fetus is seen. In this case, careful follow-up, including repeat 

blood tests and repeat ultrasound, is necessary.


     You should rest in bed until the symptoms have resolved.  Do not douche or 

have sex for at least a week, or until OK'd by the doctor. Don't use tampons.


     Call the doctor or return for re-examination if there is an increase in 

bleeding or cramping, extreme weakness, fainting, new abdominal pain, fever, or 

passage of tissue.








*********************************************************************

****************************************************





The ultrasound shows a twin pregnancy measuring 6 weeks 0 days for each fetus.  


Twin A does not show a heart beat at this point, twin B shows a heart rate of 

65.





Drink plenty of fluids, and rest.


Call [facility] to schedule a follow-up appointment in the 

next few days.





RETURN TO THE EMERGENCY ROOM IF ANY NEW OR WORSENING SYMPTOMS.








Referrals: 


DOROTA AVELAR MD [Primary Care Provider] - Follow up in 3-5 days





I personally performed the services described in the documentation, reviewed and

edited the documentation which was dictated to the scribe in my presence, and it

accurately records my words and actions.

## 2020-06-03 NOTE — RADIOLOGY REPORT (SQ)
EXAM DESCRIPTION:  U/S OB TRANSVAGINAL W/O DOP



IMAGES COMPLETED DATE/TIME:  6/3/2020 9:20 am



REASON FOR STUDY:  5-6 wks, bleeding, rising HCG



COMPARISON:  5/27/2020



TECHNIQUE:  Dynamic and static grayscale images acquired of the pelvis via transvaginal approach and 
recorded on PACS. Additional selected color Doppler and spectral images recorded.



LIMITATIONS:  None.



FINDINGS:  Twin intrauterine pregnancy with crown-rump length of 2.8 mm and 3.3 mm, twin a and twin B
 respectively.  No fetal heart tones twin a. fetal heart tones 65 beats per minute twin B. estimated 
gestational age 6 weeks 0 days.  Estimated due date 1/27/2021.  No subchorionic hemorrhage.



IMPRESSION:  6 weeks 0 day twin pregnancy with no fetal heart tones twin a.



TECHNICAL DOCUMENTATION:  JOB ID:  [id number]

 2011 [employer]- All Rights Reserved                          Rev-[id number]



Reading location - IP/workstation name: [email or web address]

## 2021-01-27 ENCOUNTER — HOSPITAL ENCOUNTER (INPATIENT)
Dept: HOSPITAL 62 - LC | Age: 26
LOS: 2 days | Discharge: HOME | End: 2021-01-29
Attending: OBSTETRICS & GYNECOLOGY | Admitting: OBSTETRICS & GYNECOLOGY
Payer: MEDICAID

## 2021-01-27 DIAGNOSIS — Z67.11: ICD-10-CM

## 2021-01-27 DIAGNOSIS — O26.893: ICD-10-CM

## 2021-01-27 DIAGNOSIS — Z87.891: ICD-10-CM

## 2021-01-27 DIAGNOSIS — Z3A.39: ICD-10-CM

## 2021-01-27 DIAGNOSIS — D64.9: ICD-10-CM

## 2021-01-27 LAB
ADD MANUAL DIFF: NO
APPEARANCE UR: CLEAR
APTT PPP: YELLOW S
BARBITURATES UR QL SCN: NEGATIVE
BASOPHILS # BLD AUTO: 0 10^3/UL (ref 0–0.2)
BASOPHILS NFR BLD AUTO: 0.4 % (ref 0–2)
BILIRUB UR QL STRIP: NEGATIVE
EOSINOPHIL # BLD AUTO: 0 10^3/UL (ref 0–0.6)
EOSINOPHIL NFR BLD AUTO: 0.4 % (ref 0–6)
ERYTHROCYTE [DISTWIDTH] IN BLOOD BY AUTOMATED COUNT: 16.7 % (ref 11.5–14)
GLUCOSE UR STRIP-MCNC: NEGATIVE MG/DL
HCT VFR BLD CALC: 31.9 % (ref 36–47)
HGB BLD-MCNC: 10.2 G/DL (ref 12–15.5)
KETONES UR STRIP-MCNC: NEGATIVE MG/DL
LYMPHOCYTES # BLD AUTO: 1.6 10^3/UL (ref 0.5–4.7)
LYMPHOCYTES NFR BLD AUTO: 24.2 % (ref 13–45)
MCH RBC QN AUTO: 24.5 PG (ref 27–33.4)
MCHC RBC AUTO-ENTMCNC: 31.8 G/DL (ref 32–36)
MCV RBC AUTO: 77 FL (ref 80–97)
METHADONE UR QL SCN: NEGATIVE
MONOCYTES # BLD AUTO: 0.6 10^3/UL (ref 0.1–1.4)
MONOCYTES NFR BLD AUTO: 9.1 % (ref 3–13)
NEUTROPHILS # BLD AUTO: 4.4 10^3/UL (ref 1.7–8.2)
NEUTS SEG NFR BLD AUTO: 65.9 % (ref 42–78)
NITRITE UR QL STRIP: NEGATIVE
PCP UR QL SCN: NEGATIVE
PH UR STRIP: 7 [PH] (ref 5–9)
PLATELET # BLD: 287 10^3/UL (ref 150–450)
PROT UR STRIP-MCNC: NEGATIVE MG/DL
RBC # BLD AUTO: 4.15 10^6/UL (ref 3.72–5.28)
SP GR UR STRIP: 1.01
TOTAL CELLS COUNTED % (AUTO): 100 %
URINE AMPHETAMINES SCREEN: NEGATIVE
URINE BENZODIAZEPINES SCREEN: NEGATIVE
URINE COCAINE SCREEN: NEGATIVE
URINE MARIJUANA (THC) SCREEN: NEGATIVE
UROBILINOGEN UR-MCNC: NEGATIVE MG/DL (ref ?–2)
WBC # BLD AUTO: 6.7 10^3/UL (ref 4–10.5)

## 2021-01-27 PROCEDURE — 36415 COLL VENOUS BLD VENIPUNCTURE: CPT

## 2021-01-27 PROCEDURE — 85025 COMPLETE CBC W/AUTO DIFF WBC: CPT

## 2021-01-27 PROCEDURE — 85027 COMPLETE CBC AUTOMATED: CPT

## 2021-01-27 PROCEDURE — 85461 HEMOGLOBIN FETAL: CPT

## 2021-01-27 PROCEDURE — 81005 URINALYSIS: CPT

## 2021-01-27 PROCEDURE — 80307 DRUG TEST PRSMV CHEM ANLYZR: CPT

## 2021-01-27 PROCEDURE — 86592 SYPHILIS TEST NON-TREP QUAL: CPT

## 2021-01-27 PROCEDURE — 86850 RBC ANTIBODY SCREEN: CPT

## 2021-01-27 PROCEDURE — 86900 BLOOD TYPING SEROLOGIC ABO: CPT

## 2021-01-27 PROCEDURE — 86901 BLOOD TYPING SEROLOGIC RH(D): CPT

## 2021-01-27 RX ADMIN — DOCUSATE SODIUM SCH MG: 100 CAPSULE, LIQUID FILLED ORAL at 17:34

## 2021-01-27 RX ADMIN — Medication SCH: at 10:23

## 2021-01-27 RX ADMIN — FERROUS SULFATE TAB 325 MG (65 MG ELEMENTAL FE) SCH MG: 325 (65 FE) TAB at 17:34

## 2021-01-27 RX ADMIN — Medication SCH: at 21:50

## 2021-01-27 RX ADMIN — SENNOSIDES, DOCUSATE SODIUM SCH: 50; 8.6 TABLET, FILM COATED ORAL at 10:25

## 2021-01-27 RX ADMIN — IBUPROFEN SCH: 800 TABLET, FILM COATED ORAL at 10:23

## 2021-01-27 RX ADMIN — Medication SCH: at 10:24

## 2021-01-27 RX ADMIN — Medication SCH: at 17:33

## 2021-01-27 RX ADMIN — IBUPROFEN SCH MG: 800 TABLET, FILM COATED ORAL at 21:49

## 2021-01-27 RX ADMIN — DOCUSATE SODIUM SCH: 100 CAPSULE, LIQUID FILLED ORAL at 10:24

## 2021-01-27 RX ADMIN — FERROUS SULFATE TAB 325 MG (65 MG ELEMENTAL FE) SCH: 325 (65 FE) TAB at 10:24

## 2021-01-27 RX ADMIN — IBUPROFEN SCH: 800 TABLET, FILM COATED ORAL at 13:05

## 2021-01-27 NOTE — ADMISSION PHYSICAL
=================================================================



=================================================================

Datetime Report Generated by CPN: 2021 05:20

   

   

=================================================================

CURRENT ADMISSION

=================================================================

   

Chief Complaint:  Uterine Contractions

Indication for Induction:  Not Applicable

Admit Impression :  Term, Intrauterine Pregnancy

Admit Plan:  Admit to Unit; Initiate Labor Protocol

   

=================================================================

ALLERGIES

=================================================================

   

Medication Allergies:  No

Medication Allergies:  No Known Allergies (2020)

Latex:  No Latex Allergies

Food Allergies:  none

Environmental Allergies:  none

   

=================================================================

OBSTETRICAL HISTORY

=================================================================

   

EDC:  2021 00:00

:  2

Para:  1

Term:  1

:  0

SAB:  0

IAB:  0

Ectopic:  0

Livin

Cesareans:  0

VBACs:  0

Multiple Births:  0

Gestational Diabetes:  No

Rh Sensitization:  No

Incompetent Cervix:  No

JERICA:  No

Infertility:  No

ART Treatment:  No

Uterine Anomaly:  No

IUGR:  No

Hx Previous C/S:  No

Macrosomia:  No

Hx Loss/Stillborn:  No

PIH:  No

Hx  Death:  No

Placenta Previa/Abruption:  No

Depression/PP Depression:  No

PTL/PROM:  No

Post Partum Hemorrhage:  No

Current Pregnancy Procedures:  Ultrasound

Obstetrical History Comments:  G1- , vaginal 

   G2- Current 

   

=================================================================

***SEE PRENATAL RECORDS***

=================================================================

   

Alcohol:  No

Marijuana :  No

Cocaine:  No

Other Illicit Drugs:  No

Cigarettes:  Former Smoker. [id number]

   

=================================================================

MEDICAL HISTORY

=================================================================

   

Diabetes:  No

Blood Transfusion:  No

Pulmonary Disease (Asthma, TB):  No

Breast Disease:  No

Hypertension:  No

Gyn Surgery:  No

Heart Disease:  No

Hosp/Surgery:  Yes

Autoimmune Disorder:  No

Anesthetic Complications:  No

Kidney Disease:  No

Abnormal Pap Smear:  No

Neuro/Epilepsy:  No

Psychiatric Disorders:  No

Other Medical Diseases:  No

Hepatitis/Liver Disease:  No

Significant Family History:  No

Varicosities/Phlebitis:  No

Trauma/Violence :  No

Thyroid Dysfunction:  No

Medical History Comments:  gallbladder, gastric sleeve

   

=================================================================

INFECTIOUS HISTORY

=================================================================

   

Gonorrhea:  No

Genital Herpes:  No

Chlamydia:  Yes

Tuberculosis:  No

Syphilis:  No

Hepatitis:  No

HIV/AIDS Exposure:  No

Rash or Viral Illness:  No

HPV:  No

Infectious History Comments:  Hx of chlamydia outside of pregnancy- 10

   years ago per pt 

   

=================================================================

PHYSICAL EXAM

=================================================================

   

General:  Normal

HEENT:  Normal

Neurologic:  Normal

Thyroid:  Normal

Heart:  Normal

Lungs:  Normal

Breast:  Deferred

Back:  Normal

Abdomen:  Normal

Genitourinary Exam:  Normal

Extremities:  Normal

DTRs:  Normal

Pelvic Type:  Adequate

   

=================================================================

FETUS A

=================================================================

   

EGA:  39.3

   

=================================================================

PLANS FOR LABOR AND DELIVERY

=================================================================

   

Labor and Delivery:  None

Pain Management:  Epidural

Feeding Preference:  Breast

Benefit of Breast Feed Discussed:  Yes

Circumcision:  N/A

   

=================================================================

INFORMED CONSENT

=================================================================

   

Signature:  Electronically signed by Mehrdad Daly MD ([employer]) on

   2021 at 05:20  with User ID: [email or web address]

## 2021-01-27 NOTE — BIRTH CERTIFICATE DATA
=================================================================

Birth Cert Data

=================================================================

Datetime Report Generated by CPN: 2021 06:44

   

   

=================================================================

BIRTH CERTIFICATE DATA

=================================================================

   

Delivery Provider:  Mehrdad Daly MD    (2021 00:59:Mehrdad Daly MD ([facility]))

 47b. Date of First Visit:  2020 00:00    (2021

   00:59:Shreya Box RN)

 47c. Date of Last Visit:  2021 00:00    (2021

   00:59:Shreya Box RN)

 47d. Number of Prenatal Visits:  9    (2021 00:59:Shreya Box RN)

 48a. Number of Prev Live Births:  1    (2021 00:59:Shreya Box RN)

 48b. Now Livin    (2021 00:59:Shreya Box RN)

 48c. Live Births Now Dead:  0    (2021 00:59:QS system process)

 48d. Date of Last Live Birth:  2011 00:00    (2021

   00:59:Shreya Box RN)

 48e. Pregnancy Losses:  0    (2021 00:59:Shreya Box RN)

   

=================================================================

RISK FACTORS IN THIS PREGNANCY

=================================================================

   

 49a. Diabetes:  No    (2021 00:59:Shreya Box RN)

 49b. Hypertension:  No    (2021 00:59:Shreya Box RN)

 49c. Previous  Births:  0    (2021 00:59:Shreya Box RN)

 49d. Stillborns:  No    (2021 00:59:Shreya Box RN)

 49d. IUGR:  No    (2021 00:59:Shreya Box RN)

 49e. Infertility Treatment:  No    (2021 00:59:Shreya Box RN)

 49f. Previous Cesareans:  0    (2021 00:59:Shreya Box RN)

   

=================================================================

Mother's Height

=================================================================

   

 50b. Height Inches:  65    (2021 01:09:QS system process)

   

=================================================================

Mother's Weight 

=================================================================

   

 51a. Pre-Pregnancy Weight (lbs):  197    (2021 00:59:Shreya Box RN)

 51b. Weight at Delivery (lbs):  207    (2021 02:01:QS system

   process)

 52. Dt Last Normal Menses Began:  2020 00:00    (2021

   00:59:Shreya Box RN)

   

=================================================================

Infections Present/Treated

=================================================================

   

 53a. Gonorrhea:  No    (2021 00:59:Shreya Box RN)

 Results this Hospital Visit :  Negative    (2021 00:59:Shreya Box RN)

 53b. Syphilis:  No    (2021 00:59:Shreya Box RN)

 53c. Chlamydia:  Yes    (2021 00:59:Shreya Box RN)

 Results this Hospital Visit:  Negative    (2021 00:59:Shreya Box RN)

 53d. Hepatitis B:  No    (2021 00:59:Shreya Box RN)

 Results this Hospital Visit:  Negative    (2021 00:59:Shreya Box RN)

 53e. Hepatitis C:  Negative    (2021 00:59:Shreya Box RN)

 53h. Mother Tested for HBsAG:  Yes    (2021 00:59:Shreya Box RN)

 53i. Date Tested:  2020 00:00    (2021 00:59:Shreya Box RN)

 53j. Test Result:  Negative    (2021 00:59:Shreya Box RN)

   

=================================================================

Obstetric Procedures 

=================================================================

   

 54a, b, c. Obstetric Procedures:  Ultrasound    (2021

   00:59:Shreya Box RN)

   

=================================================================

Cigarette Smoking 

=================================================================

   

 Cigarette Smoking:  Former Smoker. [id number]    (2021

   00:59:Shreya Box RN)

 55a. 3 Months Before Preg - Ci    (2021 00:59:Shreya Box RN)

   55a. Packs:  0    (2021 00:59:Shreya Box RN)

 55b. 1st Trimester of Preg- Ci    (2021 00:59:Shreya Box RN)

   55b. Packs:  0    (2021 00:59:Shreya Box RN)

 55c. 2nd Trimester of Preg- Ci    (2021 00:59:Shreya Box RN)

   55c. Packs:  0    (2021 00:59:Shreya Box RN)

 55d. 3rd Trimester of Preg- Ci    (2021 00:59:Shreya Box RN)

   55d. Packs:  0    (2021 00:59:Shreya Box RN)

   

=================================================================

Onset of Labor

=================================================================

   

 56a. PROM >12 Hrs:  0.62    (2021 04:38:QS system process)

 56b. Precipitous Labor <3 Hrs:  2    (2021 00:59:QS system

   process)

 56c. Prolonged Labor > 20 Hrs:  2    (2021 00:59:QS system

   process)

   

=================================================================



=================================================================

   

 57a. Induction of Labor:  N/A    (2021 00:59:Magdalena Gonzalez RN)

 57c. Non-Vertex Presentation A:  Vertex    (2021 00:59:Magdalena Gonzalez RN)

 57d. Steroids - Fetal Lung Mat:  None    (2021 00:59:Magdalena Gonzalez RN)

 57d. Steroids - Fetal Lung Mat:  Not Applicable    (2021

   00:59:Magdalena Gonzalez RN)

 57f. Mat Chorio or Temp >100.4:  98.4    (2021 00:59:Shreya Box RN)

 57g. Moderate/Heavy Meconium:  Moderate Meconium    (2021

   04:38:Shreya Box RN)

 57h. Fetal Intolerance of Labor:  N/A    (2021 00:59:Magdalena Gonzalez RN)

:  N/A    (2021 00:59:Magdalena Gonzalez RN)

 57i. Epidural/Spinal Anesthesia:  None    (2021 00:59:Magdalena Gonzalez RN)

   

=================================================================

Method of Delivery

=================================================================

   

 58a. Forceps - Unsuccessful A:  N/A    (2021 00:59:Magdalena Gonzalez RN)

 58b. Vacuum - Unsuccessful A:  N/A    (2021 00:59:Magdalena Gonzalez RN)

   

=================================================================

58c. Presentation at Birth

=================================================================

   

 58c. Presentation at Birth - A :  Vertex    (2021 00:59:Magdalena Gonzalez RN)

 58c. Presentation at Birth - A :  N/A    (2021 00:59:Magdalena Gonzalez RN)

 58c. Presentation at Birth - A :  Cephalic    (2021

   00:59:Magdalena Gonzalez RN)

   

=================================================================

Final Route and Method of Del 

=================================================================

   

 58d. Baby A Route/Delivery:  Vaginal    (2021 00:59:Mehrdad Daly MD ([facility]))

 58e. Trial of Labor Attempted:  No    (2021 00:59:Magdalena Gonzalez RN)

 58e. Trial of Labor Attempted A:  N/A    (2021 00:59:Magdalena Gonzalez RN)

 58e. Trial of Labor Attempted B:  N/A    (2021 00:59:Magdalena Gonzalez RN)

   

=================================================================

Maternal Morbidity

=================================================================

   

 59b. 3rd or 4th Degree Lacs:  None    (2021 00:59:Mehrdad Daly MD ([facility])

 59b. 3rd or 4th Degree Lacs:  N/A    (2021 00:59:Magdalena

   MAUREEN Gonzalez)

   

=================================================================



=================================================================

   

 Birthweight Baby A:  3050    (2021 00:59:Sue Montemayor RN)

 60a. Pounds :  6    (2021 00:59:QS system process)

 60b. Ounces:  12    (2021 00:59:QS system process)

   

=================================================================

61. GA at Delivery 

=================================================================

   

 Baby A:  39.3    (2021 00:59:Magdalena Gonzalez RN)

:  Full Term- 39- 40.6 Weeks    (2021 00:59:QS system process)

   

=================================================================

62a. APGAR 5 Minute

=================================================================

   

 Baby A:  9    (2021 00:59:QS system process)

## 2021-01-27 NOTE — DELIVERY SUMMARY
=================================================================

Del Sum A-C

=================================================================

Datetime Report Generated by CPN: 2021 06:44

   

   

=================================================================

DELIVERY PERSONNEL

=================================================================

   

DELIVERY PERSONNEL:  [id number]

Delivery Doctor::  Mehrdad Daly MD

Labor and Delivery Nurse::  Shreya Box RN

Labor and Delivery Nurse::  Rosa Elena Bellavance, RNC

   

=================================================================

MATERNAL INFORMATION

=================================================================

   

Delivery Anesthesia:  None

Medications After Delivery:  Pitocin 30 Units in 500ml NS/D5W

Delivery QBL:  300

Maternal Complications:  Precipitous Labor (<3hrs)

   

=================================================================

LABOR SUMMARY

=================================================================

   

EDC:  2021 00:00

No. Babies in Womb:  1

 Attempted:  No

Labor Anesthesia:  None

   

=================================================================

LABOR INFORMATION

=================================================================

   

Reason for Induction:  Not Applicable

Onset of Labor:  2021 02:50

Complete Dilatation:  2021 05:12

Oxytocin:  N/A

Group B Beta Strep:  Negative

Antibiotics # of Doses:  0

Name of Antibiotic Given:  n/a

Steroids Given:  None

Reason Steroids Not Administered:  Not Applicable

   

=================================================================

MEMBRANES

=================================================================

   

Membranes Rupture Method:  Spontaneous

Rupture of Membranes:  2021 04:38

Length of Rupture (hr):  0.62

Amniotic Fluid Color:  Moderate Meconium

Amniotic Fluid Amount:  Moderate

Amniotic Fluid Odor:  Normal

   

=================================================================

STAGES OF LABOR

=================================================================

   

Stage 1 hr:  2

Stage 1 min:  22

Stage 2 hr:  0

Stage 2 min:  3

Stage 3 hr:  0

Stage 3 min:  3

Total Time in Labor hr:  2

Total Time in Labor min:  28

   

=================================================================

VAGINAL DELIVERY

=================================================================

   

Episiotomy:  None

Laceration #1:  None

Laceration Repair:  Not Applicable

Sponge Count Correct:  N/A

Sharps Count Correct:  N/A

   

=================================================================

CSECTION DELIVERY

=================================================================

   

Primary Indication:  N/A

Secondary Indication:  N/A

CSection Incidence:  N/A

Labor:  N/A

Elective:  N/A

CSection Incision:  N/A

   

=================================================================

BABY A INFORMATION

=================================================================

   

Infant Delivery Date/Time:  2021 05:15

Method of Delivery:  Vaginal

Nurse Controlled Delivery:  No

Born in Route :  No

:  N/A

Forceps:  N/A

Vacuum Extraction:  N/A

Shoulder Dystocia :  No

   

=================================================================

PRESENTATION/POSITION BABY A

=================================================================

   

Presentation:  Cephalic

Cephalic Presentation:  Vertex

Vertex Position:  Left Occipital Anterior

Breech Presentation:  N/A

   

=================================================================

PLACENTA INFORMATION BABY A

=================================================================

   

Placenta Delivery Time :  2021 05:18

Placenta Method of Delivery:  Spontaneous

Placenta Status:  Delivered

   

=================================================================

APGAR SCORES BABY A

=================================================================

   

Heart Rate 1 min:  >100 bpm

Resp Effort 1 min:  Good Cry

Reflex Irritability 1 min:  Cough or Sneeze or Pulls Away

Muscle Tone 1 min:  Active Motion

Color 1 min:  Body Pink, Extremities Blue

Resuscitation Effort 1 min:  Tactile Stimulation

APGAR SCORE 1 MIN:  9

Heart Rate 5 min:  >100 bpm

Resp Effort 5 min:  Good Cry

Reflex Irritability 5 min:  Cough or Sneeze or Pulls Away

Muscle Tone 5 min:  Active Motion

Color 5 min:  Body Pink, Extremities Blue

APGAR SCORE 5 MIN:  9

   

=================================================================

INFANT INFORMATION BABY A

=================================================================

   

Gestational Age at Delivery:  39.3

Gestational Status:  Full Term- 39- 40.6 Weeks

Infant Outcome :  Liveborn

Infant Condition :  Stable

Infant Sex:  Female

   

=================================================================

IDENTIFICATION BABY A

=================================================================

   

Infant Verification Date/Time:  2021 05:24

ID Band Number:  [id number]

Mother's Name Verified:  Yes

Infant Medical Record Number:  [id number]

RN Verifying Infant:  MARTA Melo/ANDREW Bey

   

=================================================================

WEIGHT/LENGTH BABY A

=================================================================

   

Infant Birthweight (gm):  3050

Infant Weight (lb):  6

Infant Weight (oz):  12

Infant Length (in):  19.75

Infant Length (cm):  50.17

   

=================================================================

CORD INFORMATION BABY A

=================================================================

   

No. Cord Vessels:  3

Nuchal Cord :  Around Neck x1, Loose

Cord Blood Taken:  Yes-For Eval (Mom's Blood Type - or O+)

Infant Suction:  None

   

=================================================================

ASSESSMENT BABY A

=================================================================

   

Infant Complications:  None

Physical Findings at Delivery:  Within Normal Limits

Infant Respirations:  Appears Normal

Skin to Skin:  Yes

Skin to Skin Time (min):  60

Neonatologist/ALS Called :  No

Transferred To:  Remains with Mother

   

=================================================================

BABY B INFORMATION

=================================================================

   

 :  N/A

   

=================================================================

SIGNATURES

=================================================================

   

Signature:  Electronically signed by Mehrdad Daly MD ([email or web address]) on

   2021 at 05:21  with User ID: [email or web address]

## 2021-01-27 NOTE — PDOC PROGRESS REPORT
Subjective-OB


Progress Note for:: 21


Subjective: 





26yo  s/p  delivery day. Voiding and ambulating without difficulty, 

reports pain well controlled with medication just very tired at this time. 

Denies any concerns. 





Physical Exam (OB)


Vital Signs: 


                                        











Temp Pulse Resp BP Pulse Ox


 


 97.8 F   71   16   117/62   100 


 


 21 08:45  21 08:43  21 08:43  21 08:43  21 08:43








                                 Intake & Output











 21





 06:59 06:59 06:59


 


Weight  94.3 kg 














- General


General Appearance: Appears well


In distress: None





- PIH/Pre-Eclampsia


Clonus: Negative





- Maternal Morbidity


59. Maternal Morbidity (serious complications experinced by the mother 

associated with labor and delivery: None of the above





- Lochia


Lochia Amount: Small 10-25 ml


Lochia Color: Rubra/Red





- Abdomen


Description: Soft


Hernia Present: No


Fundal Description: Firm, Midline


Fundal Height: u/u - u/2





- Respiratory


Respiratory Status: No respiratory distress





- Extremities


Upper extremity: Normal inspection





- Neurological


Cognition: Normal


Orientation: AAOx4





Objective-Diagnostic


Laboratory: 


                                        





                                 21 03:10 





                                        











  21





  01:02 03:10 03:10


 


WBC   6.7 


 


RBC   4.15 


 


Hgb   10.2 L 


 


Hct   31.9 L 


 


MCV   77 L 


 


MCH   24.5 L 


 


MCHC   31.8 L 


 


RDW   16.7 H 


 


Plt Count   287 


 


Seg Neutrophils %   65.9 


 


Urine Color  YELLOW  


 


Urine Appearance  CLEAR  


 


Urine pH  7.0  


 


Ur Specific Gravity  1.009  


 


Urine Protein  NEGATIVE  


 


Urine Glucose (UA)  NEGATIVE  


 


Urine Ketones  NEGATIVE  


 


Urine Blood  NEGATIVE  


 


Urine Nitrite  NEGATIVE  


 


Ur Leukocyte Esterase  TRACE H  


 


Blood Type    A NEGATIVE


 


Antibody Screen    NEGATIVE














Assessment and Plan(PN)





- Assessment and Plan


(1) Vaginal delivery


Is this a current diagnosis for this admission?: Yes   


Plan: 


 routine pp care 








(2) Anemia complicating pregnancy, third trimester


Is this a current diagnosis for this admission?: Yes   


Plan: 


 increase dietary iron and FeS04 BID 








- Time Spent with Patient


Time with patient: Less than 15 minutes


Medications reviewed and adjusted accordingly: Yes





- Disposition


Anticipated Discharge Disposition: Home, Self Care


Anticipated Discharge Timeframe: within 48 hours

## 2021-01-28 LAB
ERYTHROCYTE [DISTWIDTH] IN BLOOD BY AUTOMATED COUNT: 16.8 % (ref 11.5–14)
HCT VFR BLD CALC: 31.2 % (ref 36–47)
HGB BLD-MCNC: 9.9 G/DL (ref 12–15.5)
MCH RBC QN AUTO: 24.4 PG (ref 27–33.4)
MCHC RBC AUTO-ENTMCNC: 31.8 G/DL (ref 32–36)
MCV RBC AUTO: 77 FL (ref 80–97)
PLATELET # BLD: 251 10^3/UL (ref 150–450)
RBC # BLD AUTO: 4.07 10^6/UL (ref 3.72–5.28)
WBC # BLD AUTO: 6.2 10^3/UL (ref 4–10.5)

## 2021-01-28 RX ADMIN — DOCUSATE SODIUM SCH MG: 100 CAPSULE, LIQUID FILLED ORAL at 18:22

## 2021-01-28 RX ADMIN — SENNOSIDES, DOCUSATE SODIUM SCH EACH: 50; 8.6 TABLET, FILM COATED ORAL at 09:13

## 2021-01-28 RX ADMIN — Medication SCH: at 22:22

## 2021-01-28 RX ADMIN — IBUPROFEN SCH MG: 800 TABLET, FILM COATED ORAL at 06:31

## 2021-01-28 RX ADMIN — Medication SCH: at 15:09

## 2021-01-28 RX ADMIN — FERROUS SULFATE TAB 325 MG (65 MG ELEMENTAL FE) SCH MG: 325 (65 FE) TAB at 18:23

## 2021-01-28 RX ADMIN — IBUPROFEN SCH MG: 800 TABLET, FILM COATED ORAL at 14:58

## 2021-01-28 RX ADMIN — IBUPROFEN SCH MG: 800 TABLET, FILM COATED ORAL at 22:23

## 2021-01-28 RX ADMIN — Medication SCH: at 06:31

## 2021-01-28 RX ADMIN — Medication SCH CAP: at 09:13

## 2021-01-28 RX ADMIN — FERROUS SULFATE TAB 325 MG (65 MG ELEMENTAL FE) SCH MG: 325 (65 FE) TAB at 09:13

## 2021-01-28 RX ADMIN — DOCUSATE SODIUM SCH MG: 100 CAPSULE, LIQUID FILLED ORAL at 09:13

## 2021-01-28 NOTE — PDOC PROGRESS REPORT
Subjective-OB


Progress Note for:: 01/28/21


Subjective: 





Doing well, no c/o, breastfeeding, second boy, voiding, ambulating





Physical Exam (OB)


Vital Signs: 


                                        











Temp Pulse Resp BP Pulse Ox


 


 97.9 F   70   17   101/52 L  100 


 


 01/28/21 07:32  01/28/21 07:32  01/28/21 07:32  01/28/21 07:32  01/28/21 07:32








                                 Intake & Output











 01/27/21 01/28/21 01/29/21





 06:59 06:59 06:59


 


Intake Total  780 


 


Balance  780 


 


Weight 94.3 kg  














- PIH/Pre-Eclampsia


Clonus: Negative


Headache: Absent


Epigastric Pain: No


Visual Changes: No





- Maternal Morbidity


59. Maternal Morbidity (serious complications experinced by the mother 

associated with labor and delivery: None of the above





- Lochia


Lochia Amount: Small 10-25 ml


Lochia Color: Rubra/Red





- Abdomen


Description: Soft, Round


Hernia Present: No


Fundal Description: Firm, Midline


Fundal Height: u/u - u/2





Objective-Diagnostic


Laboratory: 


                                        





                                 01/28/21 07:01 





                                        











  01/28/21 01/28/21





  07:01 07:01


 


WBC   6.2


 


RBC   4.07


 


Hgb   9.9 L


 


Hct   31.2 L


 


MCV   77 L


 


MCH   24.4 L


 


MCHC   31.8 L


 


RDW   16.8 H


 


Plt Count   251


 


Blood Type  A NEGATIVE 














Assessment and Plan(PN)





- Assessment and Plan


(1) Hx of bariatric surgery


Is this a current diagnosis for this admission?: Yes   





(2) Vaginal delivery


Is this a current diagnosis for this admission?: Yes   





(3) Anemia complicating pregnancy, third trimester


Is this a current diagnosis for this admission?: Yes   





- Time Spent with Patient


Time with patient: Less than 15 minutes


Medications reviewed and adjusted accordingly: Yes





- Disposition


Anticipated Discharge Disposition: Home, Self Care


Anticipated Discharge Timeframe: within 24 hours

## 2021-01-29 VITALS — DIASTOLIC BLOOD PRESSURE: 67 MMHG | SYSTOLIC BLOOD PRESSURE: 114 MMHG

## 2021-01-29 PROCEDURE — 3E0234Z INTRODUCTION OF SERUM, TOXOID AND VACCINE INTO MUSCLE, PERCUTANEOUS APPROACH: ICD-10-PCS | Performed by: OBSTETRICS & GYNECOLOGY

## 2021-01-29 RX ADMIN — Medication SCH: at 05:09

## 2021-01-29 RX ADMIN — DOCUSATE SODIUM SCH MG: 100 CAPSULE, LIQUID FILLED ORAL at 10:31

## 2021-01-29 RX ADMIN — FERROUS SULFATE TAB 325 MG (65 MG ELEMENTAL FE) SCH MG: 325 (65 FE) TAB at 10:31

## 2021-01-29 RX ADMIN — SENNOSIDES, DOCUSATE SODIUM SCH EACH: 50; 8.6 TABLET, FILM COATED ORAL at 10:31

## 2021-01-29 RX ADMIN — IBUPROFEN SCH MG: 800 TABLET, FILM COATED ORAL at 05:05

## 2021-01-29 RX ADMIN — Medication SCH CAP: at 10:30

## 2021-01-29 NOTE — PDOC DISCHARGE SUMMARY
Impression





- Admit/DC Date/PCP


Admission Date/Primary Care Provider: 


  01/27/21 02:55





  SIRIA LEBRON MD





Discharge Date: 01/29/21





- Discharge Diagnosis


(1) Active labor at term


Is this a current diagnosis for this admission?: Yes   





(2) Anemia complicating pregnancy, third trimester


Is this a current diagnosis for this admission?: Yes   





(3) Hx of bariatric surgery


Is this a current diagnosis for this admission?: Yes   








(5) Vaginal delivery


Is this a current diagnosis for this admission?: Yes   








- Additional Information


Discharge Diet: Regular


Discharge Activity: Balance Activity w/Rest, Pelvic Rest


Referrals: 


SIRIA LEBRON MD [Primary Care Provider] -  (Call [facility] to make 4 week follow up 

appt)


Prescriptions: 


Ibuprofen [Motrin 800 mg Tablet] 800 mg PO Q8HP PRN #90 tablet


 PRN Reason: 


Home Medications: 








Prenatal Vit,Calc76/Iron/Folic [Prenatabs Rx Tablet] 1 tab PO DAILY 01/27/21 


Ibuprofen [Motrin 800 mg Tablet] 800 mg PO Q8HP PRN #90 tablet 01/29/21 











Hospital Course


59. Maternal Morbidity (serious complications experinced by the mother 

associated with labor and delivery: None of the above





Results


Laboratory Results: 


                                        











WBC  6.2 10^3/uL (4.0-10.5)   01/28/21  07:01    


 


RBC  4.07 10^6/uL (3.72-5.28)   01/28/21  07:01    


 


Hgb  9.9 g/dL (12.0-15.5)  L  01/28/21  07:01    


 


Hct  31.2 % (36.0-47.0)  L  01/28/21  07:01    


 


MCV  77 fl (80-97)  L  01/28/21  07:01    


 


MCH  24.4 pg (27.0-33.4)  L  01/28/21  07:01    


 


MCHC  31.8 g/dL (32.0-36.0)  L  01/28/21  07:01    


 


RDW  16.8 % (11.5-14.0)  H  01/28/21  07:01    


 


Plt Count  251 10^3/uL (150-450)   01/28/21  07:01    


 


Lymph % (Auto)  24.2 % (13-45)   01/27/21  03:10    


 


Mono % (Auto)  9.1 % (3-13)   01/27/21  03:10    


 


Eos % (Auto)  0.4 % (0-6)   01/27/21  03:10    


 


Baso % (Auto)  0.4 % (0-2)   01/27/21  03:10    


 


Absolute Neuts (auto)  4.4 10^3/uL (1.7-8.2)   01/27/21  03:10    


 


Absolute Lymphs (auto)  1.6 10^3/uL (0.5-4.7)   01/27/21  03:10    


 


Absolute Monos (auto)  0.6 10^3/uL (0.1-1.4)   01/27/21  03:10    


 


Absolute Eos (auto)  0.0 10^3/uL (0.0-0.6)   01/27/21  03:10    


 


Absolute Basos (auto)  0.0 10^3/uL (0.0-0.2)   01/27/21  03:10    


 


Seg Neutrophils %  65.9 % (42-78)   01/27/21  03:10    


 


Urine Color  YELLOW   01/27/21  01:02    


 


Urine Appearance  CLEAR   01/27/21  01:02    


 


Urine pH  7.0  (5.0-9.0)   01/27/21  01:02    


 


Ur Specific Gravity  1.009   01/27/21  01:02    


 


Urine Protein  NEGATIVE mg/dL (NEGATIVE)   01/27/21  01:02    


 


Urine Glucose (UA)  NEGATIVE mg/dL (NEGATIVE)   01/27/21  01:02    


 


Urine Ketones  NEGATIVE mg/dL (NEGATIVE)   01/27/21  01:02    


 


Urine Blood  NEGATIVE  (NEGATIVE)   01/27/21  01:02    


 


Urine Nitrite  NEGATIVE  (NEGATIVE)   01/27/21  01:02    


 


Urine Bilirubin  NEGATIVE  (NEGATIVE)   01/27/21  01:02    


 


Urine Urobilinogen  NEGATIVE mg/dL (<2.0)   01/27/21  01:02    


 


Ur Leukocyte Esterase  TRACE  (NEGATIVE)  H  01/27/21  01:02    


 


Urine Ascorbic Acid  NEGATIVE  (NEGATIVE)   01/27/21  01:02    


 


Urine Opiates Screen  NEGATIVE   01/27/21  01:02    


 


Urine Methadone Screen  NEGATIVE   01/27/21  01:02    


 


Ur Barbiturates Screen  NEGATIVE   01/27/21  01:02    


 


Ur Phencyclidine Scrn  NEGATIVE   01/27/21  01:02    


 


Ur Amphetamines Screen  NEGATIVE   01/27/21  01:02    


 


U Benzodiazepines Scrn  NEGATIVE   01/27/21  01:02    


 


Urine Cocaine Screen  NEGATIVE   01/27/21  01:02    


 


U Marijuana (THC) Screen  NEGATIVE   01/27/21  01:02    


 


RPR  NONREACTIVE  (NONREACTIVE)   01/27/21  03:10    


 


Blood Type  A NEGATIVE   01/28/21  07:01    


 


Antibody Screen  NEGATIVE   01/27/21  03:10    


 


Fetal Screen  NEGATIVE   01/28/21  07:01    














Plan


Plan of Treatment: 


follow up in 4 weeks at [facility] for post partum visit